# Patient Record
Sex: MALE | Race: WHITE | NOT HISPANIC OR LATINO | Employment: OTHER | URBAN - METROPOLITAN AREA
[De-identification: names, ages, dates, MRNs, and addresses within clinical notes are randomized per-mention and may not be internally consistent; named-entity substitution may affect disease eponyms.]

---

## 2020-05-28 LAB
BUN SERPL-MCNC: 21.2 MG/DL (ref 8.4–25.7)
CALCIUM SERPL-MCNC: 8.6 MG/DL (ref 8.8–10)
CHLORIDE SERPL-SCNC: 107 MMOL/L (ref 98–107)
CO2 SERPL-SCNC: 24 MMOL/L (ref 23–31)
CREAT SERPL-MCNC: 0.8 MG/DL (ref 0.73–1.18)
CREAT/UREA NIT SERPL: 26
ERYTHROCYTE [DISTWIDTH] IN BLOOD BY AUTOMATED COUNT: 12.5 % (ref 11.5–17)
GLUCOSE SERPL-MCNC: 110 MG/DL (ref 82–115)
HCT VFR BLD AUTO: 41.9 % (ref 42–52)
HGB BLD-MCNC: 13.9 GM/DL (ref 14–18)
INR PPP: 1.2 (ref 0–1.3)
MAGNESIUM SERPL-MCNC: 1.95 MG/DL (ref 1.6–2.6)
MCH RBC QN AUTO: 31.7 PG (ref 27–31)
MCHC RBC AUTO-ENTMCNC: 33.2 GM/DL (ref 33–36)
MCV RBC AUTO: 95.4 FL (ref 80–94)
PLATELET # BLD AUTO: 156 X10(3)/MCL (ref 130–400)
PMV BLD AUTO: 10.8 FL (ref 9.4–12.4)
POTASSIUM SERPL-SCNC: 4.3 MMOL/L (ref 3.5–5.1)
PROTHROMBIN TIME: 14.6 SECOND(S) (ref 11.1–13.7)
RBC # BLD AUTO: 4.39 X10(6)/MCL (ref 4.7–6.1)
SODIUM SERPL-SCNC: 138 MMOL/L (ref 136–145)
WBC # SPEC AUTO: 7.6 X10(3)/MCL (ref 4.5–11.5)

## 2020-06-02 ENCOUNTER — HOSPITAL ENCOUNTER (OUTPATIENT)
Dept: ADMINISTRATIVE | Facility: HOSPITAL | Age: 65
End: 2020-06-03
Attending: INTERNAL MEDICINE | Admitting: INTERNAL MEDICINE

## 2020-06-02 LAB
BUN SERPL-MCNC: 19.3 MG/DL (ref 8.4–25.7)
CALCIUM SERPL-MCNC: 8.6 MG/DL (ref 8.8–10)
CHLORIDE SERPL-SCNC: 105 MMOL/L (ref 98–107)
CO2 SERPL-SCNC: 24 MMOL/L (ref 23–31)
CREAT SERPL-MCNC: 1.39 MG/DL (ref 0.73–1.18)
CREAT/UREA NIT SERPL: 14
GLUCOSE SERPL-MCNC: 115 MG/DL (ref 82–115)
POTASSIUM SERPL-SCNC: 4 MMOL/L (ref 3.5–5.1)
SODIUM SERPL-SCNC: 137 MMOL/L (ref 136–145)

## 2020-06-03 LAB
ABS NEUT (OLG): 10.33 X10(3)/MCL (ref 2.1–9.2)
ALBUMIN SERPL-MCNC: 3.7 GM/DL (ref 3.4–4.8)
ALBUMIN/GLOB SERPL: 1.3 RATIO (ref 1.1–2)
ALP SERPL-CCNC: 56 UNIT/L (ref 40–150)
ALT SERPL-CCNC: 37 UNIT/L (ref 0–55)
AST SERPL-CCNC: 70 UNIT/L (ref 5–34)
BASOPHILS # BLD AUTO: 0 X10(3)/MCL (ref 0–0.2)
BASOPHILS NFR BLD AUTO: 0 %
BILIRUB SERPL-MCNC: 0.5 MG/DL
BILIRUBIN DIRECT+TOT PNL SERPL-MCNC: 0.1 MG/DL (ref 0–0.5)
BILIRUBIN DIRECT+TOT PNL SERPL-MCNC: 0.4 MG/DL (ref 0–0.8)
BUN SERPL-MCNC: 15.6 MG/DL (ref 8.4–25.7)
CALCIUM SERPL-MCNC: 8.2 MG/DL (ref 8.8–10)
CHLORIDE SERPL-SCNC: 104 MMOL/L (ref 98–107)
CO2 SERPL-SCNC: 28 MMOL/L (ref 23–31)
CREAT SERPL-MCNC: 0.94 MG/DL (ref 0.73–1.18)
EOSINOPHIL # BLD AUTO: 0 X10(3)/MCL (ref 0–0.9)
EOSINOPHIL NFR BLD AUTO: 0 %
ERYTHROCYTE [DISTWIDTH] IN BLOOD BY AUTOMATED COUNT: 12.7 % (ref 11.5–17)
GLOBULIN SER-MCNC: 2.9 GM/DL (ref 2.4–3.5)
GLUCOSE SERPL-MCNC: 130 MG/DL (ref 82–115)
HCT VFR BLD AUTO: 41 % (ref 42–52)
HGB BLD-MCNC: 13.5 GM/DL (ref 14–18)
LYMPHOCYTES # BLD AUTO: 0.9 X10(3)/MCL (ref 0.6–4.6)
LYMPHOCYTES NFR BLD AUTO: 7 %
MCH RBC QN AUTO: 31.8 PG (ref 27–31)
MCHC RBC AUTO-ENTMCNC: 32.9 GM/DL (ref 33–36)
MCV RBC AUTO: 96.7 FL (ref 80–94)
MONOCYTES # BLD AUTO: 1.1 X10(3)/MCL (ref 0.1–1.3)
MONOCYTES NFR BLD AUTO: 9 %
NEUTROPHILS # BLD AUTO: 10.33 X10(3)/MCL (ref 2.1–9.2)
NEUTROPHILS NFR BLD AUTO: 83 %
PLATELET # BLD AUTO: 158 X10(3)/MCL (ref 130–400)
PMV BLD AUTO: 10.9 FL (ref 9.4–12.4)
POTASSIUM SERPL-SCNC: 4.1 MMOL/L (ref 3.5–5.1)
PROT SERPL-MCNC: 6.6 GM/DL (ref 5.8–7.6)
RBC # BLD AUTO: 4.24 X10(6)/MCL (ref 4.7–6.1)
SODIUM SERPL-SCNC: 138 MMOL/L (ref 136–145)
WBC # SPEC AUTO: 12.4 X10(3)/MCL (ref 4.5–11.5)

## 2020-07-28 ENCOUNTER — HISTORICAL (OUTPATIENT)
Dept: CARDIOLOGY | Facility: HOSPITAL | Age: 65
End: 2020-07-28

## 2023-02-16 ENCOUNTER — HOSPITAL ENCOUNTER (INPATIENT)
Facility: HOSPITAL | Age: 68
LOS: 3 days | Discharge: HOME | DRG: 202 | End: 2023-02-19
Attending: STUDENT IN AN ORGANIZED HEALTH CARE EDUCATION/TRAINING PROGRAM | Admitting: INTERNAL MEDICINE
Payer: MEDICARE

## 2023-02-16 DIAGNOSIS — R09.02 HYPOXIA: Primary | ICD-10-CM

## 2023-02-16 DIAGNOSIS — R07.9 CHEST PAIN: ICD-10-CM

## 2023-02-16 DIAGNOSIS — J18.9 PNEUMONIA DUE TO INFECTIOUS ORGANISM, UNSPECIFIED LATERALITY, UNSPECIFIED PART OF LUNG: ICD-10-CM

## 2023-02-16 DIAGNOSIS — R06.02 SHORTNESS OF BREATH: ICD-10-CM

## 2023-02-16 DIAGNOSIS — R06.02 SOB (SHORTNESS OF BREATH): ICD-10-CM

## 2023-02-16 DIAGNOSIS — J44.9 CHRONIC OBSTRUCTIVE PULMONARY DISEASE, UNSPECIFIED COPD TYPE: ICD-10-CM

## 2023-02-16 LAB
ALBUMIN SERPL-MCNC: 4.2 G/DL (ref 3.4–4.8)
ALBUMIN/GLOB SERPL: 1.3 RATIO (ref 1.1–2)
ALP SERPL-CCNC: 58 UNIT/L (ref 40–150)
ALT SERPL-CCNC: 34 UNIT/L (ref 0–55)
APPEARANCE UR: CLEAR
AST SERPL-CCNC: 20 UNIT/L (ref 5–34)
BACTERIA #/AREA URNS AUTO: NORMAL /HPF
BASOPHILS # BLD AUTO: 0.02 X10(3)/MCL (ref 0–0.2)
BASOPHILS NFR BLD AUTO: 0.2 %
BILIRUB UR QL STRIP.AUTO: NEGATIVE MG/DL
BILIRUBIN DIRECT+TOT PNL SERPL-MCNC: 0.4 MG/DL
BUN SERPL-MCNC: 25.4 MG/DL (ref 8.4–25.7)
CALCIUM SERPL-MCNC: 9.7 MG/DL (ref 8.8–10)
CHLORIDE SERPL-SCNC: 103 MMOL/L (ref 98–107)
CO2 SERPL-SCNC: 24 MMOL/L (ref 23–31)
COLOR UR AUTO: YELLOW
CREAT SERPL-MCNC: 1.18 MG/DL (ref 0.73–1.18)
EOSINOPHIL # BLD AUTO: 0.01 X10(3)/MCL (ref 0–0.9)
EOSINOPHIL NFR BLD AUTO: 0.1 %
ERYTHROCYTE [DISTWIDTH] IN BLOOD BY AUTOMATED COUNT: 13.5 % (ref 11.5–17)
FLUAV AG UPPER RESP QL IA.RAPID: NOT DETECTED
FLUBV AG UPPER RESP QL IA.RAPID: NOT DETECTED
GFR SERPLBLD CREATININE-BSD FMLA CKD-EPI: >60 MLS/MIN/1.73/M2
GLOBULIN SER-MCNC: 3.2 GM/DL (ref 2.4–3.5)
GLUCOSE SERPL-MCNC: 166 MG/DL (ref 82–115)
GLUCOSE UR QL STRIP.AUTO: ABNORMAL MG/DL
HCT VFR BLD AUTO: 46.4 % (ref 42–52)
HGB BLD-MCNC: 15.9 GM/DL (ref 14–18)
IMM GRANULOCYTES # BLD AUTO: 0.16 X10(3)/MCL (ref 0–0.04)
IMM GRANULOCYTES NFR BLD AUTO: 1.6 %
INR BLD: 1.74 (ref 0–1.3)
KETONES UR QL STRIP.AUTO: NEGATIVE MG/DL
LEUKOCYTE ESTERASE UR QL STRIP.AUTO: NEGATIVE UNIT/L
LYMPHOCYTES # BLD AUTO: 0.79 X10(3)/MCL (ref 0.6–4.6)
LYMPHOCYTES NFR BLD AUTO: 7.9 %
MCH RBC QN AUTO: 31.9 PG
MCHC RBC AUTO-ENTMCNC: 34.3 MG/DL (ref 33–36)
MCV RBC AUTO: 93.2 FL (ref 80–94)
MONOCYTES # BLD AUTO: 0.52 X10(3)/MCL (ref 0.1–1.3)
MONOCYTES NFR BLD AUTO: 5.2 %
NEUTROPHILS # BLD AUTO: 8.47 X10(3)/MCL (ref 2.1–9.2)
NEUTROPHILS NFR BLD AUTO: 85 %
NITRITE UR QL STRIP.AUTO: NEGATIVE
NRBC BLD AUTO-RTO: 0 %
PH UR STRIP.AUTO: 5 [PH]
PLATELET # BLD AUTO: 205 X10(3)/MCL (ref 130–400)
PMV BLD AUTO: 9.9 FL (ref 7.4–10.4)
POTASSIUM SERPL-SCNC: 4.2 MMOL/L (ref 3.5–5.1)
PROT SERPL-MCNC: 7.4 GM/DL (ref 5.8–7.6)
PROT UR QL STRIP.AUTO: NEGATIVE MG/DL
PROTHROMBIN TIME: 20.1 SECONDS (ref 12.5–14.5)
RBC # BLD AUTO: 4.98 X10(6)/MCL (ref 4.7–6.1)
RBC #/AREA URNS AUTO: <5 /HPF
RBC UR QL AUTO: NEGATIVE UNIT/L
SARS-COV-2 RNA RESP QL NAA+PROBE: NOT DETECTED
SODIUM SERPL-SCNC: 140 MMOL/L (ref 136–145)
SP GR UR STRIP.AUTO: 1.02 (ref 1–1.03)
SQUAMOUS #/AREA URNS AUTO: <5 /HPF
TROPONIN I SERPL-MCNC: <0.01 NG/ML (ref 0–0.04)
UROBILINOGEN UR STRIP-ACNC: 0.2 MG/DL
WBC # SPEC AUTO: 10 X10(3)/MCL (ref 4.5–11.5)
WBC #/AREA URNS AUTO: <5 /HPF

## 2023-02-16 PROCEDURE — 96374 THER/PROPH/DIAG INJ IV PUSH: CPT

## 2023-02-16 PROCEDURE — 94761 N-INVAS EAR/PLS OXIMETRY MLT: CPT

## 2023-02-16 PROCEDURE — 80053 COMPREHEN METABOLIC PANEL: CPT | Performed by: PHYSICIAN ASSISTANT

## 2023-02-16 PROCEDURE — 11000001 HC ACUTE MED/SURG PRIVATE ROOM

## 2023-02-16 PROCEDURE — 87798 DETECT AGENT NOS DNA AMP: CPT | Performed by: INTERNAL MEDICINE

## 2023-02-16 PROCEDURE — 99285 EMERGENCY DEPT VISIT HI MDM: CPT | Mod: 25

## 2023-02-16 PROCEDURE — 85025 COMPLETE CBC W/AUTO DIFF WBC: CPT | Performed by: PHYSICIAN ASSISTANT

## 2023-02-16 PROCEDURE — 93005 ELECTROCARDIOGRAM TRACING: CPT

## 2023-02-16 PROCEDURE — 25000242 PHARM REV CODE 250 ALT 637 W/ HCPCS: Performed by: NURSE PRACTITIONER

## 2023-02-16 PROCEDURE — 25000242 PHARM REV CODE 250 ALT 637 W/ HCPCS: Performed by: STUDENT IN AN ORGANIZED HEALTH CARE EDUCATION/TRAINING PROGRAM

## 2023-02-16 PROCEDURE — 27000221 HC OXYGEN, UP TO 24 HOURS

## 2023-02-16 PROCEDURE — 27000190 HC CPAP FULL FACE MASK W/VALVE

## 2023-02-16 PROCEDURE — 25500020 PHARM REV CODE 255: Performed by: STUDENT IN AN ORGANIZED HEALTH CARE EDUCATION/TRAINING PROGRAM

## 2023-02-16 PROCEDURE — 87040 BLOOD CULTURE FOR BACTERIA: CPT | Performed by: STUDENT IN AN ORGANIZED HEALTH CARE EDUCATION/TRAINING PROGRAM

## 2023-02-16 PROCEDURE — 63600175 PHARM REV CODE 636 W HCPCS: Performed by: STUDENT IN AN ORGANIZED HEALTH CARE EDUCATION/TRAINING PROGRAM

## 2023-02-16 PROCEDURE — 85610 PROTHROMBIN TIME: CPT | Performed by: PHYSICIAN ASSISTANT

## 2023-02-16 PROCEDURE — 25000003 PHARM REV CODE 250: Performed by: STUDENT IN AN ORGANIZED HEALTH CARE EDUCATION/TRAINING PROGRAM

## 2023-02-16 PROCEDURE — 84484 ASSAY OF TROPONIN QUANT: CPT | Performed by: PHYSICIAN ASSISTANT

## 2023-02-16 PROCEDURE — 99900031 HC PATIENT EDUCATION (STAT)

## 2023-02-16 PROCEDURE — 93010 EKG 12-LEAD: ICD-10-PCS | Mod: ,,, | Performed by: INTERNAL MEDICINE

## 2023-02-16 PROCEDURE — 94660 CPAP INITIATION&MGMT: CPT

## 2023-02-16 PROCEDURE — 87641 MR-STAPH DNA AMP PROBE: CPT | Performed by: INTERNAL MEDICINE

## 2023-02-16 PROCEDURE — 81001 URINALYSIS AUTO W/SCOPE: CPT | Performed by: PHYSICIAN ASSISTANT

## 2023-02-16 PROCEDURE — 94640 AIRWAY INHALATION TREATMENT: CPT

## 2023-02-16 PROCEDURE — 0240U COVID/FLU A&B PCR: CPT | Performed by: PHYSICIAN ASSISTANT

## 2023-02-16 PROCEDURE — 94799 UNLISTED PULMONARY SVC/PX: CPT

## 2023-02-16 PROCEDURE — 99900035 HC TECH TIME PER 15 MIN (STAT)

## 2023-02-16 PROCEDURE — 93010 ELECTROCARDIOGRAM REPORT: CPT | Mod: ,,, | Performed by: INTERNAL MEDICINE

## 2023-02-16 RX ORDER — MELOXICAM 15 MG/1
15 TABLET ORAL NIGHTLY
COMMUNITY

## 2023-02-16 RX ORDER — CLONIDINE HYDROCHLORIDE 0.3 MG/1
0.5 TABLET ORAL 2 TIMES DAILY
Status: ON HOLD | COMMUNITY
End: 2023-02-19 | Stop reason: HOSPADM

## 2023-02-16 RX ORDER — SODIUM CHLORIDE 0.9 % (FLUSH) 0.9 %
10 SYRINGE (ML) INJECTION EVERY 12 HOURS PRN
Status: DISCONTINUED | OUTPATIENT
Start: 2023-02-16 | End: 2023-02-19 | Stop reason: HOSPADM

## 2023-02-16 RX ORDER — METFORMIN HYDROCHLORIDE 500 MG/1
500 TABLET ORAL 2 TIMES DAILY
COMMUNITY
Start: 2023-01-24

## 2023-02-16 RX ORDER — ACETAMINOPHEN 325 MG/1
650 TABLET ORAL EVERY 4 HOURS PRN
Status: DISCONTINUED | OUTPATIENT
Start: 2023-02-16 | End: 2023-02-19 | Stop reason: HOSPADM

## 2023-02-16 RX ORDER — ESCITALOPRAM OXALATE 20 MG/1
20 TABLET ORAL NIGHTLY
COMMUNITY
Start: 2023-01-24

## 2023-02-16 RX ORDER — METOPROLOL SUCCINATE 25 MG/1
12.5 TABLET, EXTENDED RELEASE ORAL 2 TIMES DAILY
COMMUNITY

## 2023-02-16 RX ORDER — ACETAMINOPHEN 325 MG/1
650 TABLET ORAL EVERY 8 HOURS PRN
Status: DISCONTINUED | OUTPATIENT
Start: 2023-02-16 | End: 2023-02-19 | Stop reason: HOSPADM

## 2023-02-16 RX ORDER — ALBUTEROL SULFATE 0.83 MG/ML
2.5 SOLUTION RESPIRATORY (INHALATION)
Status: COMPLETED | OUTPATIENT
Start: 2023-02-16 | End: 2023-02-16

## 2023-02-16 RX ORDER — DAPAGLIFLOZIN 10 MG/1
10 TABLET, FILM COATED ORAL EVERY MORNING
COMMUNITY
Start: 2023-01-18

## 2023-02-16 RX ORDER — MELATONIN 10 MG
TABLET, SUBLINGUAL SUBLINGUAL
COMMUNITY

## 2023-02-16 RX ORDER — FUROSEMIDE 20 MG/1
20 TABLET ORAL EVERY MORNING
COMMUNITY

## 2023-02-16 RX ORDER — NALOXONE HCL 0.4 MG/ML
0.02 VIAL (ML) INJECTION
Status: DISCONTINUED | OUTPATIENT
Start: 2023-02-16 | End: 2023-02-19 | Stop reason: HOSPADM

## 2023-02-16 RX ORDER — INSULIN ASPART 100 [IU]/ML
0-5 INJECTION, SOLUTION INTRAVENOUS; SUBCUTANEOUS
Status: DISCONTINUED | OUTPATIENT
Start: 2023-02-16 | End: 2023-02-19 | Stop reason: HOSPADM

## 2023-02-16 RX ORDER — OMEPRAZOLE 20 MG/1
20 CAPSULE, DELAYED RELEASE ORAL EVERY MORNING
COMMUNITY

## 2023-02-16 RX ORDER — ATORVASTATIN CALCIUM 20 MG/1
TABLET, FILM COATED ORAL
COMMUNITY

## 2023-02-16 RX ORDER — IPRATROPIUM BROMIDE AND ALBUTEROL SULFATE 2.5; .5 MG/3ML; MG/3ML
3 SOLUTION RESPIRATORY (INHALATION) EVERY 4 HOURS PRN
Status: DISCONTINUED | OUTPATIENT
Start: 2023-02-16 | End: 2023-02-19 | Stop reason: HOSPADM

## 2023-02-16 RX ORDER — IBUPROFEN 200 MG
15 TABLET ORAL
Status: DISCONTINUED | OUTPATIENT
Start: 2023-02-16 | End: 2023-02-19 | Stop reason: HOSPADM

## 2023-02-16 RX ORDER — POTASSIUM CHLORIDE 1500 MG/1
20 TABLET, EXTENDED RELEASE ORAL NIGHTLY
COMMUNITY

## 2023-02-16 RX ORDER — RIVAROXABAN 20 MG/1
15 TABLET, FILM COATED ORAL EVERY MORNING
COMMUNITY
Start: 2023-01-11

## 2023-02-16 RX ORDER — ARFORMOTEROL TARTRATE 15 UG/2ML
15 SOLUTION RESPIRATORY (INHALATION) 2 TIMES DAILY
COMMUNITY

## 2023-02-16 RX ORDER — TALC
6 POWDER (GRAM) TOPICAL NIGHTLY PRN
Status: DISCONTINUED | OUTPATIENT
Start: 2023-02-16 | End: 2023-02-19 | Stop reason: HOSPADM

## 2023-02-16 RX ORDER — HYDROCODONE BITARTRATE AND ACETAMINOPHEN 7.5; 325 MG/1; MG/1
1 TABLET ORAL
Status: COMPLETED | OUTPATIENT
Start: 2023-02-16 | End: 2023-02-16

## 2023-02-16 RX ORDER — METHYLPREDNISOLONE SOD SUCC 125 MG
125 VIAL (EA) INJECTION
Status: COMPLETED | OUTPATIENT
Start: 2023-02-16 | End: 2023-02-16

## 2023-02-16 RX ORDER — SIMETHICONE 80 MG
1 TABLET,CHEWABLE ORAL 4 TIMES DAILY PRN
Status: DISCONTINUED | OUTPATIENT
Start: 2023-02-16 | End: 2023-02-19 | Stop reason: HOSPADM

## 2023-02-16 RX ORDER — PANTOPRAZOLE SODIUM 40 MG/1
40 TABLET, DELAYED RELEASE ORAL NIGHTLY
Status: ON HOLD | COMMUNITY
Start: 2023-01-24 | End: 2023-02-19 | Stop reason: HOSPADM

## 2023-02-16 RX ORDER — IBUPROFEN 200 MG
30 TABLET ORAL
Status: DISCONTINUED | OUTPATIENT
Start: 2023-02-16 | End: 2023-02-19 | Stop reason: HOSPADM

## 2023-02-16 RX ORDER — GLUCAGON 1 MG
1 KIT INJECTION
Status: DISCONTINUED | OUTPATIENT
Start: 2023-02-16 | End: 2023-02-19 | Stop reason: HOSPADM

## 2023-02-16 RX ADMIN — AZITHROMYCIN 500 MG: 500 INJECTION, POWDER, LYOPHILIZED, FOR SOLUTION INTRAVENOUS at 06:02

## 2023-02-16 RX ADMIN — METHYLPREDNISOLONE SODIUM SUCCINATE 125 MG: 125 INJECTION, POWDER, FOR SOLUTION INTRAMUSCULAR; INTRAVENOUS at 03:02

## 2023-02-16 RX ADMIN — CEFTRIAXONE SODIUM 2 G: 2 INJECTION, POWDER, FOR SOLUTION INTRAMUSCULAR; INTRAVENOUS at 05:02

## 2023-02-16 RX ADMIN — IPRATROPIUM BROMIDE AND ALBUTEROL SULFATE 3 ML: 2.5; .5 SOLUTION RESPIRATORY (INHALATION) at 09:02

## 2023-02-16 RX ADMIN — IOPAMIDOL 100 ML: 755 INJECTION, SOLUTION INTRAVENOUS at 03:02

## 2023-02-16 RX ADMIN — ALBUTEROL SULFATE 2.5 MG: 2.5 SOLUTION RESPIRATORY (INHALATION) at 02:02

## 2023-02-16 RX ADMIN — HYDROCODONE BITARTRATE AND ACETAMINOPHEN 1 TABLET: 7.5; 325 TABLET ORAL at 03:02

## 2023-02-16 NOTE — H&P
[facility] Medicine History & Physical Examination       Patient Name: Dom Varner  MRN: [id number]  Patient Class: IP- Inpatient   Admission Date: 2/16/2023   Admitting Physician: AMBER Service   Length of Stay: 0  Attending Physician: Dr. César Byrd   Primary Care Provider: Gildardo Villanueva MD  Face-to-Face encounter date: 02/16/2023  Code Status: Full code  Chief Complaint: Chest Pain (Cp to mid chest pain since last night, reports sob w/ hx copd, afib, and bilateral pe. Pt on 3L o2 at home. 91% on room air in triage. Denies fever, reports cough started symptoms. Pt on xarelto.)        Patient information was obtained from patient, patient's family, past medical records and ER records.     HISTORY OF PRESENT ILLNESS:   Dom Varner is a 67 y.o. male who PMH includes MAHSA, COPD, emphysema, chronic respiratory failure with hypoxia on BiPAP at night, DM type 2, History of PE   Osteoarthritis;  present to the ED at [facility] on 2/16/2023 with a primary complaint of chest pain with associated shortness a breath which started last night and today prompting ED visit.  Patient reports he is on supplemental oxygen therapy per nasal cannula primarily at night however he was needing to wear the oxygen during the day today and reported that his saturations were low at home.  It was reported his O2 saturations were 91% in triage on room air.  No reports of fever, cough, congestion, nausea, vomiting, diarrhea, or any sick contacts.  Patient is on Xarelto. Labs done demonstrated glucose 166; other indices unremarkable.  Influenza a swab influenza B swab SARs CoV 2 PCR not detected.  Initial vital signs /76 pulse 73 respirations 22 temperature 98.4° F O2 saturation 91% on room air.  Patient was placed on supplemental oxygen therapy with improvement in his saturations to 95%.  Blood cultures were collected x2 sets.  Patient was started on IV azithromycin and ceftriaxone therapy.   Patient is admitted to hospital medicine services for further management.  PAST MEDICAL HISTORY:   MAHSA  COPD   Emphysema   HTN  DM type 2   History of PE   Osteoarthritis     PAST SURGICAL HISTORY:   Angiogram  Endoscopy    ALLERGIES:   Codeine and Penicillins    FAMILY HISTORY:   Reviewed and negative    SOCIAL HISTORY:   Denies any alcohol use  Denies any illicit drug use  Denies any tobacco use  Lives with family     HOME MEDICATIONS:   As documented:   Albuterol MDI   Albuterol neb treatments   Atorvastatin 20 mg 1 p.o. q.h.s.   Chlorzoxazone 500 mg 1 p.o. q.8 hours PRN for pain   Clonazepam 0.5 mg p.o. PRN   Escitalopram 20 mg 1 p.o. daily   Farxiga 10 mg 1 p.o. daily   Furosemide 20 mg 1 p.o. daily   Meloxicam 15 mg p.o. PRN   Metformin 1000 mg 1 p.o. b.i.d.   Metoprolol 25 mg 1/2 tab p.o. b.i.d.   Omeprazole 20 mg 1 p.o. daily   Potassium chloride 20 mEq p.o. daily   Xarelto 15 mg 1 p.o. daily  Tramadol 50 mg 1 p.o. q.8 hours PRN pain   Trelegy Ellipta 100 mcg-62.5 mcg-25 mcg powder inhalation 1 puff daily     REVIEW OF SYSTEMS:   Except as documented, all other systems reviewed and negative     PHYSICAL EXAM:     VITAL SIGNS: 24 HRS MIN & MAX LAST   Temp  Min: 98.4 °F (36.9 °C)  Max: 98.4 °F (36.9 °C) 98.4 °F (36.9 °C)   BP  Min: 123/76  Max: 147/74 (!) 147/74     Pulse  Min: 65  Max: 73  65   Resp  Min: 18  Max: 22 18   SpO2  Min: 91 %  Max: 95 % (!) 93 %         General appearance: Well-developed, well-nourished male , chronically ill-appearing looks older than stated age; fatigued mild respiratory distress  HENT: Atraumatic head. Moist mucous membranes of oral cavity.  Eyes: PERRL   Neck: Supple.   Lungs: diminished bilaterally, symmetrical expansion, mildly labored respirations fine scattered wheezes saturation stable on supplemental O2 therapy  Heart: Regular rate and rhythm. S1 and S2 present capillary refill brisk, peripheral pulses palpable   Abdomen: Soft,obese, non-distended, non-tender. No rebound  tenderness/guarding. Bowel sounds are normal.   Extremities: PORTER, generalized weakness  Skin: warm and dry   Neuro: AAOx3, no acute focal deficits  Psych/mental status: Appropriate mood and affect. Responds appropriately to questions.     LABS AND IMAGING:     Recent Labs   Lab 02/16/23  1014   WBC 10.0   RBC 4.98   HGB 15.9   HCT 46.4   MCV 93.2   MCH 31.9   MCHC 34.3   RDW 13.5      MPV 9.9       Recent Labs   Lab 02/16/23  1014      K 4.2   CO2 24   BUN 25.4   CREATININE 1.18   CALCIUM 9.7   ALBUMIN 4.2   ALKPHOS 58   ALT 34   AST 20   BILITOT 0.4       Microbiology Results (last 7 days)       Procedure Component Value Units Date/Time    Blood culture #2 **CANNOT BE ORDERED STAT** [[id number]] Collected: 02/16/23 1623    Order Status: Sent Specimen: Blood     Blood culture #1 **CANNOT BE ORDERED STAT** [[id number]] Collected: 02/16/23 1623    Order Status: Sent Specimen: Blood              CT Abdomen Pelvis With Contrast  Narrative: EXAMINATION:  CT ABDOMEN PELVIS WITH CONTRAST    CLINICAL HISTORY:  Lower back pain;    TECHNIQUE:  Low dose axial images, sagittal and coronal reformations were obtained from the lung bases to the pubic symphysis following the IV administration of contrast. Automatic exposure control (AEC) is utilized to reduce patient radiation exposure.    COMPARISON:  None.    FINDINGS:  There is bibasilar atelectasis..    There is evidence of hepatic steatosis.  The liver is slightly enlarged in size..  No liver mass or lesion is seen.  Portal and hepatic veins appear normal.    The gallbladder appears normal.  No obvious gallstones are seen.  No biliary dilatation is seen.  No pericholecystic fluid is seen.    The pancreas appears normal.  No pancreatic mass or lesion is seen.    The spleen shows no acute abnormality.    The adrenal glands appear normal.  No adrenal nodule is seen.    The kidneys appear normal.  There is a cyst in the midpole of the right kidney.  No hydronephrosis is  seen.  No hydroureter is seen.  No nephrolithiasis is seen.  No obvious ureteral stones are seen.    Urinary bladder appears grossly unremarkable.    No colitis is seen.  No diverticulitis is seen.  No obvious colonic mass or lesion is seen.  The appendix appears normal.    No free air is seen.  No free fluid is seen.    There are postsurgical changes seen in the lower lumbar spine.  Bones are otherwise unremarkable.  Impression: Hepatic steatosis    Right renal cyst    Bibasilar lung atelectasis    Otherwise grossly unremarkable    Electronically signed by: Fabby Cuevas  Date:    02/16/2023  Time:    15:26  CTA Chest Non-Coronary (PE Studies)  Narrative: EXAMINATION:  CTA CHEST NON CORONARY (PE STUDIES)    CLINICAL HISTORY:  Pulmonary embolism (PE) suspected, high prob;    TECHNIQUE:  Helically acquired images with axial, sagittal and coronal reformations were obtained from the thoracic inlet to the lung bases followingthe IV administration of contrast.  CTA timed for evaluation of the pulmonary arteries.  MIP images were performed.    Automated tube current modulation, weight-based exposure dosing, and/or iterative reconstruction technique utilized to reach lowest reasonably achievable exposure rate.    DLP: 1459 mGy*cm    COMPARISON:  CT chest 08/09/2019    FINDINGS:  BASE OF NECK: No significant abnormality.    AORTA: Left-sided aortic arch.  No aneurysm and no significant atherosclerosis    PULMONARY VASCULATURE: Pulmonary arteries enhance normally.  No evidence of pulmonary embolus.    HEART: Normal size. No effusion.    GARY/MEDIASTINUM: No enlarged lymph nodes by size criteria.    AIRWAYS: Patent.    LUNGS/PLEURA: There is centrilobular, tree-in-bud nodularity in the dependent upper lobes, right greater than left and within the bilateral lower lobes.  Scarring at the left lung base.    UPPER ABDOMEN: No abnormality of the partially imaged upper abdomen.    THORACIC SOFT TISSUES: Unremarkable.    BONES:  Degenerative changes are present at the thoracic spine.  Impression: 1. Multilobar tree-in-bud nodularity within the lungs, most pronounced in the dependent right upper lobe.  Appearance most compatible with infectious or inflammatory bronchiolitis.  2. No evidence of pulmonary embolus.    Electronically signed by: Rosanne Ocampo  Date:    02/16/2023  Time:    15:24  X-Ray Chest 1 View  Narrative: EXAMINATION:  XR CHEST 1 VIEW    CLINICAL HISTORY:  , Shortness of breath.    COMPARISON:  August 9, 2019    FINDINGS:  No alveolar consolidation, effusion, or pneumothorax is seen.   The thoracic aorta is normal  cardiac silhouette, central pulmonary vessels and mediastinum are normal in size and are grossly unremarkable.   visualized osseous structures are grossly unremarkable.    Some fibrotic streaks identified at the left base.    A loop recorder is identified  Impression: No acute chest disease is identified.    Electronically signed by: Romulo Ewing  Date:    02/16/2023  Time:    10:41        ASSESSMENT & PLAN:   ASSESSMENT:  Acute respiratory failure with hypoxia-resolving   Pneumonia-bacterial,right CAP, POA  Acute COPD exacerbation-present on admission   Hyperglycemia  Long-term anticoagulation therapy-on Xarelto   Chronic osteoarthritis-chronic pain syndrome   History of PE   Weakness   MAHSA- Chronic respiratory failure- with supplemental O2 at BIPAP at HS      PLAN:  Folic cultures and sensitivities   Continue with supplemental oxygen therapy keep saturations 92% or greater   Fall precautions   Continue with IV antibiotic therapy adjust according to cultures and sensitivities   Use home BIPAP at night as at home   Resume home medication as deemed necessary  Repeat lab work in a.m.       VTE Prophylaxis: Home Xarelto for DVT prophylaxis and will be advised to be as mobile as possible and sit in a chair as tolerated    Patient condition:   Stable    __________________________________________________________________________  INPATIENT LIST OF MEDICATIONS     Scheduled Meds:   azithromycin  500 mg Intravenous ED 1 Time    [START ON 2/17/2023] azithromycin  500 mg Intravenous Q24H    [START ON 2/17/2023] cefTRIAXone (ROCEPHIN) IVPB  1 g Intravenous Q24H    cefTRIAXone (ROCEPHIN) IVPB  2 g Intravenous ED 1 Time     Continuous Infusions:  PRN Meds:.acetaminophen, acetaminophen, dextrose 10%, dextrose 10%, dextrose, dextrose, glucagon (human recombinant), insulin aspart U-100, melatonin, naloxone, simethicone, sodium chloride 0.9%      IZak FNP have reviewed and discussed the case with Dr. César Byrd.  Please see the following addendum for further assessment and plan from there attending MD.  KATERIN Kelsey   02/16/2023    ----------------------------        66 yo obese gentleman presenting with productive cough, shortness of breath with associated hypoxia that worsens with movement. CXR was wnl. CT suggested Multilobar tree-in-bud nodularity within the lungs, most pronounced in the dependent right upper lobe, with possibility of  infectious or inflammatory bronchiolitis. CT abd showed hepatic steatosis and atelectasis with incidental right renal cyst. When seen at bedside, he was alert, resting well and saturating on mimimal nasal canula O2. Lung exam shows no wheeze, mild rhonchi noted basally. Will continue empiric cap antibiotics and follow resp pcr, mrsa pcr and resp cultures. Will review other home meds after reconciliation, in addition to bronchodilator treatments. Get TTE. Encourage IS use and wean off o2 as tolerated. Therapy when apt.       Critical care diagnosis:Acute on chronic hypoxemic respiratory failure, CAP  Critical care time: 50 minutes

## 2023-02-16 NOTE — ED PROVIDER NOTES
Encounter Date: 2/16/2023    SCRIBE #1 NOTE: I, Maria Antonia Kent, am scribing for, and in the presence of,  Hi Dupont MD. I have scribed the following portions of the note - the EKG reading. Other sections scribed: HPI, ROS, PE.     History     Chief Complaint   Patient presents with    Chest Pain     Cp to mid chest pain since last night, reports sob w/ hx copd, afib, and bilateral pe. Pt on 3L o2 at home. 91% on room air in triage. Denies fever, reports cough started symptoms. Pt on xarelto.     67 year old male w/ hx of COPD, emphysema, Afib on Xarelto, and bilateral pulmonary emboli presents to ED for shortness of breath. He says for the past week he get short of breath when he walks and his O2 drops of 80s at night. He has a bipap he uses nightly. His daughter at bedside states he usually is on 3L O2 at home at nights only, but for the last week he's had to be on it 24/7. She also states he's been coughing a lot recently. Pt also c/o lower back pain and states he's had back problems and surgeries before, but this is lower and different from usual. He states he falls a lot, walks with a cane. Pt states last night he felt like something heavy was sitting on his chest and says his phone was going off that was linked to his heart loop recorder. Pt also reports fatigue and R-sided headache. Daughter states pt recently stated on 50mg prednisone 4 days ago but has not had any relief. Pt lives with his daughter.     His cardiologist is Dr. Sexton. His pulmonologist is Dr. Satish Diaz.    The history is provided by the patient and a relative. No  was used.   Shortness of Breath  This is a chronic problem. Associated symptoms include headaches and cough. Pertinent negatives include no fever, no sore throat, no chest pain, no abdominal pain and no rash.   Review of patient's allergies indicates:   Allergen Reactions    Codeine     Penicillins      No past medical history on file.  No past surgical  history on file.  No family history on file.     Review of Systems   Constitutional:  Positive for fatigue. Negative for fever.   HENT:  Negative for sore throat.    Eyes:  Negative for visual disturbance.   Respiratory:  Positive for cough, chest tightness and shortness of breath.    Cardiovascular:  Negative for chest pain.   Gastrointestinal:  Negative for abdominal pain.   Genitourinary:  Negative for dysuria.   Musculoskeletal:  Positive for back pain. Negative for joint swelling.   Skin:  Negative for rash.   Neurological:  Positive for headaches. Negative for weakness.   Psychiatric/Behavioral:  Negative for confusion.    All other systems reviewed and are negative.    Physical Exam     Initial Vitals [02/16/23 1000]   BP Pulse Resp Temp SpO2   123/76 73 (!) 22 98.4 °F (36.9 °C) (!) 91 %      MAP       --         Physical Exam    Nursing note and vitals reviewed.  Constitutional: He appears well-developed and well-nourished. He is not diaphoretic. No distress.   HENT:   Head: Normocephalic and atraumatic.   Eyes: Conjunctivae and EOM are normal. Pupils are equal, round, and reactive to light.   Neck:   Normal range of motion.  Cardiovascular:  Normal rate, regular rhythm, normal heart sounds and intact distal pulses.           No murmur heard.  Pulmonary/Chest: No respiratory distress. He has no wheezes. He has no rales.   Occasional wheeze.    Abdominal: Abdomen is soft. He exhibits no distension. There is no abdominal tenderness. There is no rebound.   Musculoskeletal:         General: No tenderness or edema. Normal range of motion.      Cervical back: Normal range of motion.     Neurological: He is alert and oriented to person, place, and time. He has normal strength. No cranial nerve deficit. GCS score is 15. GCS eye subscore is 4. GCS verbal subscore is 5. GCS motor subscore is 6.   Skin: Skin is warm and dry. Capillary refill takes less than 2 seconds. No rash noted. No erythema.   Psychiatric: He has a  normal mood and affect.       ED Course   Procedures  Labs Reviewed   COMPREHENSIVE METABOLIC PANEL - Abnormal; Notable for the following components:       Result Value    Glucose Level 166 (*)     All other components within normal limits   PROTIME-INR - Abnormal; Notable for the following components:    PT 20.1 (*)     INR 1.74 (*)     All other components within normal limits   URINALYSIS, REFLEX TO URINE CULTURE - Abnormal; Notable for the following components:    Glucose, UA 3+ (*)     All other components within normal limits   CBC WITH DIFFERENTIAL - Abnormal; Notable for the following components:    IG# 0.16 (*)     All other components within normal limits   MRSA PCR - Abnormal; Notable for the following components:    MRSA PCR SCRN ([facility]) Detected (*)     All other components within normal limits    Narrative:     The Xpert MRSA Assay utilizes automated real-time polymerase chain reaction (PCR) to detect MRSA DNA.  A positive test result does not necessarily indicate the presence of viable organism.  It is however, presumptive for the presence of MRSA.   COMPREHENSIVE METABOLIC PANEL - Abnormal; Notable for the following components:    Glucose Level 131 (*)     Blood Urea Nitrogen 27.1 (*)     All other components within normal limits   CBC WITH DIFFERENTIAL - Abnormal; Notable for the following components:    IG# 0.25 (*)     All other components within normal limits   COVID/FLU A&B PCR - Normal    Narrative:     The Xpert Xpress SARS-CoV-2/FLU/RSV plus is a rapid, multiplexed real-time PCR test intended for the simultaneous qualitative detection and differentiation of SARS-CoV-2, Influenza A, Influenza B, and respiratory syncytial virus (RSV) viral RNA in either nasopharyngeal swab or nasal swab specimens.         TROPONIN I - Normal   URINALYSIS, MICROSCOPIC - Normal   BLOOD CULTURE [facility]   BLOOD CULTURE [facility]   RESPIRATORY CULTURE ([facility])   CBC W/ AUTO DIFFERENTIAL    Narrative:     The following orders were  created for panel order CBC auto differential.  Procedure                               Abnormality         Status                     ---------                               -----------         ------                     CBC with Differential[[id number]]        Abnormal            Final result                 Please view results for these tests on the individual orders.   CBC W/ AUTO DIFFERENTIAL    Narrative:     The following orders were created for panel order CBC with Automated Differential.  Procedure                               Abnormality         Status                     ---------                               -----------         ------                     CBC with Differential[[id number]]        Abnormal            Final result                 Please view results for these tests on the individual orders.   RESPIRATORY PANEL   POCT GLUCOSE, HAND-HELD DEVICE   POCT GLUCOSE, HAND-HELD DEVICE   POCT GLUCOSE, HAND-HELD DEVICE     EKG Readings: (Independently Interpreted)   Initial Reading: No STEMI. Rhythm: Normal Sinus Rhythm. Heart Rate: 74. Ectopy: No Ectopy. Conduction: Normal. ST Segments: Normal ST Segments. T Waves: Normal. Axis: Normal. Clinical Impression: Normal Sinus Rhythm   Taken at 1002, 02/16/23.   ECG Results              EKG 12-lead (Final result)  Result time 02/16/23 12:56:28      Final result by Interface, Lab In [facility] (02/16/23 12:56:28)                   Narrative:    Test Reason : R06.02,    Vent. Rate : 074 BPM     Atrial Rate : 074 BPM     P-R Int : 174 ms          QRS Dur : 080 ms      QT Int : 386 ms       P-R-T Axes : 028 -16 009 degrees     QTc Int : 428 ms    Normal sinus rhythm  Normal ECG  No previous ECGs available  Confirmed by Vitaly Sr MD ([id number]) on 2/16/2023 12:56:18 PM    Referred By:             Confirmed By:Vitaly Sr MD                                  Imaging Results              X-Ray Chest 1 View (In process)                      CT Abdomen Pelvis With  Contrast (Final result)  Result time 02/16/23 15:26:14      Final result by Fabby Cuevas MD (02/16/23 15:26:14)                   Impression:      Hepatic steatosis    Right renal cyst    Bibasilar lung atelectasis    Otherwise grossly unremarkable      Electronically signed by: Fabby Cuevas  Date:    02/16/2023  Time:    15:26               Narrative:    EXAMINATION:  CT ABDOMEN PELVIS WITH CONTRAST    CLINICAL HISTORY:  Lower back pain;    TECHNIQUE:  Low dose axial images, sagittal and coronal reformations were obtained from the lung bases to the pubic symphysis following the IV administration of contrast. Automatic exposure control (AEC) is utilized to reduce patient radiation exposure.    COMPARISON:  None.    FINDINGS:  There is bibasilar atelectasis..    There is evidence of hepatic steatosis.  The liver is slightly enlarged in size..  No liver mass or lesion is seen.  Portal and hepatic veins appear normal.    The gallbladder appears normal.  No obvious gallstones are seen.  No biliary dilatation is seen.  No pericholecystic fluid is seen.    The pancreas appears normal.  No pancreatic mass or lesion is seen.    The spleen shows no acute abnormality.    The adrenal glands appear normal.  No adrenal nodule is seen.    The kidneys appear normal.  There is a cyst in the midpole of the right kidney.  No hydronephrosis is seen.  No hydroureter is seen.  No nephrolithiasis is seen.  No obvious ureteral stones are seen.    Urinary bladder appears grossly unremarkable.    No colitis is seen.  No diverticulitis is seen.  No obvious colonic mass or lesion is seen.  The appendix appears normal.    No free air is seen.  No free fluid is seen.    There are postsurgical changes seen in the lower lumbar spine.  Bones are otherwise unremarkable.                                       CTA Chest Non-Coronary (PE Studies) (Final result)  Result time 02/16/23 15:24:38      Final result by Rosanne Ocampo MD  (02/16/23 15:24:38)                   Impression:      1. Multilobar tree-in-bud nodularity within the lungs, most pronounced in the dependent right upper lobe.  Appearance most compatible with infectious or inflammatory bronchiolitis.  2. No evidence of pulmonary embolus.      Electronically signed by: Rosanne Ocampo  Date:    02/16/2023  Time:    15:24               Narrative:    EXAMINATION:  CTA CHEST NON CORONARY (PE STUDIES)    CLINICAL HISTORY:  Pulmonary embolism (PE) suspected, high prob;    TECHNIQUE:  Helically acquired images with axial, sagittal and coronal reformations were obtained from the thoracic inlet to the lung bases followingthe IV administration of contrast.  CTA timed for evaluation of the pulmonary arteries.  MIP images were performed.    Automated tube current modulation, weight-based exposure dosing, and/or iterative reconstruction technique utilized to reach lowest reasonably achievable exposure rate.    DLP: 1459 mGy*cm    COMPARISON:  CT chest 08/09/2019    FINDINGS:  BASE OF NECK: No significant abnormality.    AORTA: Left-sided aortic arch.  No aneurysm and no significant atherosclerosis    PULMONARY VASCULATURE: Pulmonary arteries enhance normally.  No evidence of pulmonary embolus.    HEART: Normal size. No effusion.    GARY/MEDIASTINUM: No enlarged lymph nodes by size criteria.    AIRWAYS: Patent.    LUNGS/PLEURA: There is centrilobular, tree-in-bud nodularity in the dependent upper lobes, right greater than left and within the bilateral lower lobes.  Scarring at the left lung base.    UPPER ABDOMEN: No abnormality of the partially imaged upper abdomen.    THORACIC SOFT TISSUES: Unremarkable.    BONES: Degenerative changes are present at the thoracic spine.                                       X-Ray Chest 1 View (Final result)  Result time 02/16/23 10:41:43      Final result by Romulo Ewing MD (02/16/23 10:41:43)                   Impression:      No acute chest disease is  identified.      Electronically signed by: Romulo Ewing  Date:    02/16/2023  Time:    10:41               Narrative:    EXAMINATION:  XR CHEST 1 VIEW    CLINICAL HISTORY:  , Shortness of breath.    COMPARISON:  August 9, 2019    FINDINGS:  No alveolar consolidation, effusion, or pneumothorax is seen.   The thoracic aorta is normal  cardiac silhouette, central pulmonary vessels and mediastinum are normal in size and are grossly unremarkable.   visualized osseous structures are grossly unremarkable.    Some fibrotic streaks identified at the left base.    A loop recorder is identified                                    X-Rays:   Independently Interpreted Readings:   Chest X-Ray: No acute abnormalities.   Medications   sodium chloride 0.9% flush 10 mL (has no administration in time range)   naloxone 0.4 mg/mL injection 0.02 mg (has no administration in time range)   glucagon (human recombinant) injection 1 mg (has no administration in time range)   dextrose 10% bolus 125 mL 125 mL (has no administration in time range)   dextrose 10% bolus 250 mL 250 mL (has no administration in time range)   glucose chewable tablet 16 g (has no administration in time range)   glucose chewable tablet 32 g (has no administration in time range)   acetaminophen tablet 650 mg (has no administration in time range)   acetaminophen tablet 650 mg (has no administration in time range)   melatonin tablet 6 mg (has no administration in time range)   simethicone chewable tablet 80 mg (has no administration in time range)   insulin aspart U-100 injection 0-5 Units (has no administration in time range)   azithromycin 500 mg in dextrose 5 % 250 mL IVPB (ready to mix system) (has no administration in time range)   cefTRIAXone (ROCEPHIN) 1 g in dextrose 5 % in water (D5W) 5 % 50 mL IVPB (MB+) (has no administration in time range)   albuterol-ipratropium 2.5 mg-0.5 mg/3 mL nebulizer solution 3 mL (3 mLs Nebulization Given 2/17/23 0311)   mupirocin  2 % ointment 22 g (has no administration in time range)   atorvastatin tablet 20 mg (has no administration in time range)   EScitalopram oxalate tablet 20 mg (has no administration in time range)   metoprolol succinate (TOPROL-XL) 24 hr split tablet 12.5 mg (has no administration in time range)   pantoprazole EC tablet 40 mg (has no administration in time range)   rivaroxaban tablet 15 mg (has no administration in time range)   dextromethorphan-guaiFENesin  mg per 12 hr tablet 1 tablet (has no administration in time range)   dextromethorphan-guaiFENesin  mg/5 ml liquid 5 mL (has no administration in time range)   fluticasone furoate-vilanteroL 200-25 mcg/dose diskus inhaler 1 puff (has no administration in time range)   albuterol nebulizer solution 2.5 mg (2.5 mg Nebulization Given 2/16/23 1430)   methylPREDNISolone sodium succinate injection 125 mg (125 mg Intravenous Given 2/16/23 1505)   iopamidoL (ISOVUE-370) injection 100 mL (100 mLs Intravenous Given 2/16/23 1509)   azithromycin 500 mg in dextrose 5 % 250 mL IVPB (ready to mix system) (0 mg Intravenous Stopped 2/16/23 1911)   cefTRIAXone (ROCEPHIN) 2 g in dextrose 5 % in water (D5W) 5 % 50 mL IVPB (MB+) (0 g Intravenous Stopped 2/16/23 1740)   HYDROcodone-acetaminophen 7.5-325 mg per tablet 1 tablet (1 tablet Oral Given 2/16/23 1545)     Medical Decision Making  Problems Addressed:  Hypoxia: acute illness or injury that poses a threat to life or bodily functions  Pneumonia due to infectious organism, unspecified laterality, unspecified part of lung: acute illness or injury that poses a threat to life or bodily functions  SOB (shortness of breath): acute illness or injury that poses a threat to life or bodily functions    Amount and/or Complexity of Data Reviewed  Independent Historian: caregiver     Details: His daughter at bedside states he usually is on 3L O2 at home at nights only, but for the last week he's had to be on it 24/7. See HPI.  Labs:  ordered. Decision-making details documented in ED Course.  Radiology: ordered and independent interpretation performed. Decision-making details documented in ED Course.  ECG/medicine tests: ordered and independent interpretation performed. Decision-making details documented in ED Course.       Medical Decision Making:   History:   I obtained history from: someone other than patient.       <> Summary of History:  His daughter at bedside states he usually is on 3L O2 at home at nights only, but for the last week he's had to be on it 24/7. See HPI.  Old Medical Records: I decided to obtain old medical records.  Old Records Summarized: records from clinic visits and records from previous admission(s).       <> Summary of Records: Reviewed old records: Patient with saddle PE in 08/2022.   Initial Assessment:   SOB, requiring 3L oxygen.   Differential Diagnosis:   Judging by the patient's chief complaint and pertinent history, the patient has the following possible differential diagnoses, including but not limited to the following.  Some of these are deemed to be lower likelihood and some more likely based on my physical exam and history combined with possible lab work and/or imaging studies.   Please see the pertinent studies, and refer to the HPI.  Some of these diagnoses will take further evaluation to fully rule out, perhaps as an outpatient and the patient was encouraged to follow up when discharged for more comprehensive evaluation.    ACS, pneumonia, COVID/Flu, congestive heart failure, asthma, COPD, pleural effusion, pulmonary edema, acute bronchitis, PE, pneumothorax, hemothorax, aortic dissection, electrolyte abnormalities, anemia, anxiety     Independently Interpreted Test(s):   I have ordered and independently interpreted X-rays - see prior notes.  I have ordered and independently interpreted EKG Reading(s) - see prior notes  Clinical Tests:   Lab Tests: Ordered and Reviewed  Radiological Study: Ordered and  Reviewed  Medical Tests: Ordered and Reviewed  ED Management:  Patient is a 67-year-old male presents to the emergency department for shortness of breath, now requiring oxygen during the daytime.  States he typically only uses oxygen at night.  Associated symptoms include generalized weakness, fatigue.  Also complaining of lower back pain.  Imaging with evidence of infiltrates, started on antibiotics.  Cultures obtained.  Discussed with medicine for admission.  Answered all questions time.  Patient and family verbalized understanding agreed to plan.      Co-morbidities and/or factors adding to the complexity or risk for the patient?: Yes, hx of COPD  Differential diagnoses: as noted above  Decision to obtain previous or outside records?: Yes  Chart Review (nursing home, outside records, CareEverywhere): Yes  Review of RX medications/new RX prescribed by me?: Yes  My EKG Interpretation: see above  Labs/imaging/other tests obtained/considered (risk/benefits of testing discussed): Yes  Labs/tests intepretation: Cr Nml  My independent imaging interpretation: Yes  Treatment/interventions, IV fluids, IV medications: Yes  Complex management (IV controlled substances, went to OR, DNR, meds requiring monitoring, transfer, etc)?: Yes, given IV abx  Workup/treatment affected by social determinants of health?:No   Point of care US done/interpretation: No  Consults/radiologist/EMS/social work/family discussion/alternate history: Consult to medicine  Advanced care planning/end of life discussion: No  Shared decision making: Yes  ETOH/smoking/drug cessation discussion: No  Dispo: Admit             Scribe Attestation:   Scribe #1: I performed the above scribed service and the documentation accurately describes the services I performed. I attest to the accuracy of the note.    Attending Attestation:           Physician Attestation for Scribe:  Physician Attestation Statement for Scribe #1: I, Hi Dupont MD, reviewed  documentation, as scribed by Maria Antonia Kent in my presence, and it is both accurate and complete.           ED Course as of 02/17/23 0814   Thu Feb 16, 2023   1536 Paged [facility] medicine. [AA]   1639 Consulted hospitalist: patient is admitted. [AA]      ED Course User Index  [AA] Yoli Em                 Clinical Impression:   Final diagnoses:  [R06.02] Shortness of breath  [R09.02] Hypoxia (Primary)  [J18.9] Pneumonia due to infectious organism, unspecified laterality, unspecified part of lung  [J44.9] Chronic obstructive pulmonary disease, unspecified COPD type        ED Disposition Condition    Admit Stable                Hi Dupont MD  02/17/23 0817

## 2023-02-17 LAB
ALBUMIN SERPL-MCNC: 4.1 G/DL (ref 3.4–4.8)
ALBUMIN/GLOB SERPL: 1.4 RATIO (ref 1.1–2)
ALP SERPL-CCNC: 54 UNIT/L (ref 40–150)
ALT SERPL-CCNC: 27 UNIT/L (ref 0–55)
AORTIC ROOT ANNULUS: 3.5 CM
AORTIC VALVE CUSP SEPERATION: 1.7 CM
AST SERPL-CCNC: 15 UNIT/L (ref 5–34)
AV INDEX (PROSTH): 0.71
AV MEAN GRADIENT: 3 MMHG
AV PEAK GRADIENT: 6 MMHG
AV VALVE AREA: 2.24 CM2
AV VELOCITY RATIO: 0.7
B PERT.PT PRMT NPH QL NAA+NON-PROBE: NOT DETECTED
BASOPHILS # BLD AUTO: 0.02 X10(3)/MCL (ref 0–0.2)
BASOPHILS NFR BLD AUTO: 0.2 %
BILIRUBIN DIRECT+TOT PNL SERPL-MCNC: 0.4 MG/DL
BUN SERPL-MCNC: 27.1 MG/DL (ref 8.4–25.7)
C PNEUM DNA NPH QL NAA+NON-PROBE: NOT DETECTED
CALCIUM SERPL-MCNC: 9.7 MG/DL (ref 8.8–10)
CHLORIDE SERPL-SCNC: 103 MMOL/L (ref 98–107)
CO2 SERPL-SCNC: 27 MMOL/L (ref 23–31)
CREAT SERPL-MCNC: 1.09 MG/DL (ref 0.73–1.18)
CV ECHO LV RWT: 0.59 CM
DOP CALC AO PEAK VEL: 1.18 M/S
DOP CALC AO VTI: 21.2 CM
DOP CALC LVOT AREA: 3.1 CM2
DOP CALC LVOT DIAMETER: 2 CM
DOP CALC LVOT PEAK VEL: 0.83 M/S
DOP CALC LVOT STROKE VOLUME: 47.41 CM3
DOP CALC MV VTI: 29.3 CM
DOP CALCLVOT PEAK VEL VTI: 15.1 CM
E WAVE DECELERATION TIME: 187 MSEC
E/A RATIO: 0.97
E/E' RATIO: 10.77 M/S
ECHO LV POSTERIOR WALL: 1.3 CM (ref 0.6–1.1)
EJECTION FRACTION: 60 %
EOSINOPHIL # BLD AUTO: 0.01 X10(3)/MCL (ref 0–0.9)
EOSINOPHIL NFR BLD AUTO: 0.1 %
ERYTHROCYTE [DISTWIDTH] IN BLOOD BY AUTOMATED COUNT: 13.2 % (ref 11.5–17)
FRACTIONAL SHORTENING: 17 % (ref 28–44)
GFR SERPLBLD CREATININE-BSD FMLA CKD-EPI: >60 MLS/MIN/1.73/M2
GLOBULIN SER-MCNC: 3 GM/DL (ref 2.4–3.5)
GLUCOSE SERPL-MCNC: 131 MG/DL (ref 82–115)
HADV DNA NPH QL NAA+NON-PROBE: NOT DETECTED
HCOV 229E RNA NPH QL NAA+NON-PROBE: NOT DETECTED
HCOV HKU1 RNA NPH QL NAA+NON-PROBE: NOT DETECTED
HCOV NL63 RNA NPH QL NAA+NON-PROBE: NOT DETECTED
HCOV OC43 RNA NPH QL NAA+NON-PROBE: NOT DETECTED
HCT VFR BLD AUTO: 45.2 % (ref 42–52)
HGB BLD-MCNC: 15.7 GM/DL (ref 14–18)
HMPV RNA NPH QL NAA+NON-PROBE: NOT DETECTED
HPIV1 RNA NPH QL NAA+NON-PROBE: NOT DETECTED
HPIV2 RNA NPH QL NAA+NON-PROBE: NOT DETECTED
HPIV3 RNA NPH QL NAA+NON-PROBE: NOT DETECTED
HPIV4 RNA NPH QL NAA+NON-PROBE: NOT DETECTED
IMM GRANULOCYTES # BLD AUTO: 0.25 X10(3)/MCL (ref 0–0.04)
IMM GRANULOCYTES NFR BLD AUTO: 2.5 %
INTERVENTRICULAR SEPTUM: 1.26 CM (ref 0.6–1.1)
LEFT INTERNAL DIMENSION IN SYSTOLE: 3.68 CM (ref 2.1–4)
LEFT VENTRICLE DIASTOLIC VOLUME: 143 ML
LEFT VENTRICLE SYSTOLIC VOLUME: 57.4 ML
LEFT VENTRICULAR INTERNAL DIMENSION IN DIASTOLE: 4.43 CM (ref 3.5–6)
LEFT VENTRICULAR MASS: 212.42 G
LV LATERAL E/E' RATIO: 10 M/S
LV SEPTAL E/E' RATIO: 11.67 M/S
LVOT MG: 1 MMHG
LVOT MV: 0.54 CM/S
LYMPHOCYTES # BLD AUTO: 1.02 X10(3)/MCL (ref 0.6–4.6)
LYMPHOCYTES NFR BLD AUTO: 10.3 %
M PNEUMO DNA NPH QL NAA+NON-PROBE: NOT DETECTED
MCH RBC QN AUTO: 32.3 PG
MCHC RBC AUTO-ENTMCNC: 34.7 MG/DL (ref 33–36)
MCV RBC AUTO: 93 FL (ref 80–94)
MONOCYTES # BLD AUTO: 0.56 X10(3)/MCL (ref 0.1–1.3)
MONOCYTES NFR BLD AUTO: 5.7 %
MRSA PCR SCRN (OHS): DETECTED
MV MEAN GRADIENT: 2 MMHG
MV PEAK A VEL: 0.72 M/S
MV PEAK E VEL: 0.7 M/S
MV PEAK GRADIENT: 4 MMHG
MV STENOSIS PRESSURE HALF TIME: 93 MS
MV VALVE AREA BY CONTINUITY EQUATION: 1.62 CM2
MV VALVE AREA P 1/2 METHOD: 2.37 CM2
NEUTROPHILS # BLD AUTO: 8.05 X10(3)/MCL (ref 2.1–9.2)
NEUTROPHILS NFR BLD AUTO: 81.2 %
NRBC BLD AUTO-RTO: 0 %
PISA MRMAX VEL: 1.44 M/S
PISA TR MAX VEL: 1.46 M/S
PLATELET # BLD AUTO: 215 X10(3)/MCL (ref 130–400)
PMV BLD AUTO: 10.2 FL (ref 7.4–10.4)
POCT GLUCOSE: 397 MG/DL (ref 70–110)
POTASSIUM SERPL-SCNC: 4.3 MMOL/L (ref 3.5–5.1)
PROT SERPL-MCNC: 7.1 GM/DL (ref 5.8–7.6)
PV PEAK VELOCITY: 0.98 CM/S
RBC # BLD AUTO: 4.86 X10(6)/MCL (ref 4.7–6.1)
RSV RNA NPH QL NAA+NON-PROBE: NOT DETECTED
RV+EV RNA NPH QL NAA+NON-PROBE: NOT DETECTED
SODIUM SERPL-SCNC: 140 MMOL/L (ref 136–145)
TDI LATERAL: 0.07 M/S
TDI SEPTAL: 0.06 M/S
TDI: 0.07 M/S
TR MAX PG: 9 MMHG
WBC # SPEC AUTO: 9.9 X10(3)/MCL (ref 4.5–11.5)

## 2023-02-17 PROCEDURE — 21400001 HC TELEMETRY ROOM

## 2023-02-17 PROCEDURE — 94799 UNLISTED PULMONARY SVC/PX: CPT

## 2023-02-17 PROCEDURE — 63600175 PHARM REV CODE 636 W HCPCS: Performed by: INTERNAL MEDICINE

## 2023-02-17 PROCEDURE — 25000242 PHARM REV CODE 250 ALT 637 W/ HCPCS: Performed by: INTERNAL MEDICINE

## 2023-02-17 PROCEDURE — 80053 COMPREHEN METABOLIC PANEL: CPT | Performed by: NURSE PRACTITIONER

## 2023-02-17 PROCEDURE — 25000003 PHARM REV CODE 250: Performed by: INTERNAL MEDICINE

## 2023-02-17 PROCEDURE — 63600175 PHARM REV CODE 636 W HCPCS: Performed by: NURSE PRACTITIONER

## 2023-02-17 PROCEDURE — 99900035 HC TECH TIME PER 15 MIN (STAT)

## 2023-02-17 PROCEDURE — 25000003 PHARM REV CODE 250: Performed by: NURSE PRACTITIONER

## 2023-02-17 PROCEDURE — 27000221 HC OXYGEN, UP TO 24 HOURS

## 2023-02-17 PROCEDURE — 94761 N-INVAS EAR/PLS OXIMETRY MLT: CPT

## 2023-02-17 PROCEDURE — 94660 CPAP INITIATION&MGMT: CPT

## 2023-02-17 PROCEDURE — 85025 COMPLETE CBC W/AUTO DIFF WBC: CPT | Performed by: NURSE PRACTITIONER

## 2023-02-17 PROCEDURE — 94640 AIRWAY INHALATION TREATMENT: CPT

## 2023-02-17 PROCEDURE — 87070 CULTURE OTHR SPECIMN AEROBIC: CPT | Performed by: INTERNAL MEDICINE

## 2023-02-17 PROCEDURE — 25000242 PHARM REV CODE 250 ALT 637 W/ HCPCS: Performed by: NURSE PRACTITIONER

## 2023-02-17 RX ORDER — TRAMADOL HYDROCHLORIDE 50 MG/1
50 TABLET ORAL EVERY 6 HOURS PRN
Status: DISCONTINUED | OUTPATIENT
Start: 2023-02-17 | End: 2023-02-19 | Stop reason: HOSPADM

## 2023-02-17 RX ORDER — HYDROXYZINE HYDROCHLORIDE 25 MG/1
25 TABLET, FILM COATED ORAL 3 TIMES DAILY PRN
Status: DISCONTINUED | OUTPATIENT
Start: 2023-02-17 | End: 2023-02-19 | Stop reason: HOSPADM

## 2023-02-17 RX ORDER — ATORVASTATIN CALCIUM 10 MG/1
20 TABLET, FILM COATED ORAL NIGHTLY
Status: DISCONTINUED | OUTPATIENT
Start: 2023-02-17 | End: 2023-02-19 | Stop reason: HOSPADM

## 2023-02-17 RX ORDER — FLUTICASONE FUROATE AND VILANTEROL 200; 25 UG/1; UG/1
1 POWDER RESPIRATORY (INHALATION) DAILY
Status: DISCONTINUED | OUTPATIENT
Start: 2023-02-17 | End: 2023-02-19 | Stop reason: HOSPADM

## 2023-02-17 RX ORDER — IPRATROPIUM BROMIDE AND ALBUTEROL SULFATE 2.5; .5 MG/3ML; MG/3ML
3 SOLUTION RESPIRATORY (INHALATION) EVERY 8 HOURS
Status: DISCONTINUED | OUTPATIENT
Start: 2023-02-17 | End: 2023-02-18

## 2023-02-17 RX ORDER — ESCITALOPRAM OXALATE 10 MG/1
20 TABLET ORAL NIGHTLY
Status: DISCONTINUED | OUTPATIENT
Start: 2023-02-17 | End: 2023-02-19 | Stop reason: HOSPADM

## 2023-02-17 RX ORDER — PROMETHAZINE HYDROCHLORIDE 25 MG/ML
25 INJECTION, SOLUTION INTRAMUSCULAR; INTRAVENOUS EVERY 4 HOURS PRN
Status: DISCONTINUED | OUTPATIENT
Start: 2023-02-17 | End: 2023-02-19 | Stop reason: HOSPADM

## 2023-02-17 RX ORDER — GUAIFENESIN/DEXTROMETHORPHAN 100-10MG/5
5 SYRUP ORAL EVERY 4 HOURS PRN
Status: DISCONTINUED | OUTPATIENT
Start: 2023-02-17 | End: 2023-02-19 | Stop reason: HOSPADM

## 2023-02-17 RX ORDER — PANTOPRAZOLE SODIUM 40 MG/1
40 TABLET, DELAYED RELEASE ORAL DAILY
Status: DISCONTINUED | OUTPATIENT
Start: 2023-02-17 | End: 2023-02-19 | Stop reason: HOSPADM

## 2023-02-17 RX ORDER — MUPIROCIN 20 MG/G
1 OINTMENT TOPICAL 2 TIMES DAILY
Status: DISCONTINUED | OUTPATIENT
Start: 2023-02-17 | End: 2023-02-19 | Stop reason: HOSPADM

## 2023-02-17 RX ORDER — ONDANSETRON 2 MG/ML
4 INJECTION INTRAMUSCULAR; INTRAVENOUS EVERY 6 HOURS PRN
Status: DISCONTINUED | OUTPATIENT
Start: 2023-02-17 | End: 2023-02-19 | Stop reason: HOSPADM

## 2023-02-17 RX ADMIN — IPRATROPIUM BROMIDE AND ALBUTEROL SULFATE 3 ML: 2.5; .5 SOLUTION RESPIRATORY (INHALATION) at 08:02

## 2023-02-17 RX ADMIN — IPRATROPIUM BROMIDE AND ALBUTEROL SULFATE 3 ML: 2.5; .5 SOLUTION RESPIRATORY (INHALATION) at 04:02

## 2023-02-17 RX ADMIN — INSULIN ASPART 5 UNITS: 100 INJECTION, SOLUTION INTRAVENOUS; SUBCUTANEOUS at 10:02

## 2023-02-17 RX ADMIN — AZITHROMYCIN MONOHYDRATE 500 MG: 500 INJECTION, POWDER, LYOPHILIZED, FOR SOLUTION INTRAVENOUS at 05:02

## 2023-02-17 RX ADMIN — ACETAMINOPHEN 650 MG: 325 TABLET ORAL at 08:02

## 2023-02-17 RX ADMIN — MUPIROCIN 22 G: 20 OINTMENT TOPICAL at 08:02

## 2023-02-17 RX ADMIN — VANCOMYCIN HYDROCHLORIDE 1500 MG: 1.5 INJECTION, POWDER, LYOPHILIZED, FOR SOLUTION INTRAVENOUS at 09:02

## 2023-02-17 RX ADMIN — FLUTICASONE FUROATE AND VILANTEROL TRIFENATATE 1 PUFF: 200; 25 POWDER RESPIRATORY (INHALATION) at 08:02

## 2023-02-17 RX ADMIN — IPRATROPIUM BROMIDE AND ALBUTEROL SULFATE 3 ML: 2.5; .5 SOLUTION RESPIRATORY (INHALATION) at 03:02

## 2023-02-17 RX ADMIN — ESCITALOPRAM OXALATE 20 MG: 10 TABLET ORAL at 08:02

## 2023-02-17 RX ADMIN — METOPROLOL SUCCINATE 12.5 MG: 25 TABLET, EXTENDED RELEASE ORAL at 08:02

## 2023-02-17 RX ADMIN — TRAMADOL HYDROCHLORIDE 50 MG: 50 TABLET, COATED ORAL at 10:02

## 2023-02-17 RX ADMIN — GUAIFENESIN AND DEXTROMETHORPHAN HYDROBROMIDE 1 TABLET: 30; 600 TABLET, EXTENDED RELEASE ORAL at 08:02

## 2023-02-17 RX ADMIN — SIMETHICONE 80 MG: 80 TABLET, CHEWABLE ORAL at 08:02

## 2023-02-17 RX ADMIN — ATORVASTATIN CALCIUM 20 MG: 10 TABLET, FILM COATED ORAL at 08:02

## 2023-02-17 RX ADMIN — PANTOPRAZOLE SODIUM 40 MG: 40 TABLET, DELAYED RELEASE ORAL at 08:02

## 2023-02-17 RX ADMIN — VANCOMYCIN HYDROCHLORIDE 1500 MG: 1.5 INJECTION, POWDER, LYOPHILIZED, FOR SOLUTION INTRAVENOUS at 08:02

## 2023-02-17 RX ADMIN — CEFTRIAXONE 1 G: 1 INJECTION, POWDER, FOR SOLUTION INTRAMUSCULAR; INTRAVENOUS at 03:02

## 2023-02-17 RX ADMIN — RIVAROXABAN 15 MG: 15 TABLET, FILM COATED ORAL at 08:02

## 2023-02-17 NOTE — PLAN OF CARE
Daughter Catrina WOO [phone]. Pt dc to her home [address] [place]. All questions on dc assessment based on this address. Prior to hospitalization, pt was having trouble with O2 but daughter picking new machine up from [employer]. Denied known needs prior to dc.   02/17/23 1014   Discharge Assessment   Assessment Type Discharge Planning Assessment   Confirmed/corrected address, phone number and insurance Yes  (going to stay at daughters post discharge [address] [place]. All questions below will be based off discharge address)   Confirmed Demographics Correct on Facesheet   Source of Information patient   When was your last doctors appointment?   (PCP Dr. Gildardo Villanueva)   Communicated DULCE MARIA with patient/caregiver Date not available/Unable to determine   People in Home child(mahi), adult;grandchild(mahi)   Do you expect to return to your current living situation? No  (dc to daughters home)   Do you have help at home or someone to help you manage your care at home? Yes   Who are your caregiver(s) and their phone number(s)? daughter/ POA Catrina Waterman [phone]   Prior to hospitilization cognitive status: Unable to Assess   Current cognitive status: Alert/Oriented   Walking or Climbing Stairs ambulation difficulty, requires equipment   Mobility Management cane   Dressing/Bathing   (no difficulties)   Home Accessibility wheelchair accessible   Home Layout Able to live on 1st floor   Equipment Currently Used at Home oxygen;CPAP;cane, straight;glucometer;other (see comments)  (O2 from [employer])   Readmission within 30 days? No   Patient currently being followed by outpatient case management? No   Do you currently have service(s) that help you manage your care at home? No   Do you take prescription medications? Yes  (Meds to be called into [facility])   Do you have prescription coverage? Yes   Do you have any problems affording any of your prescribed medications? No   Is the patient taking medications as  prescribed? yes   Who is going to help you get home at discharge? daughter   How do you get to doctors appointments? car, drives self   Are you on dialysis? No   Do you take coumadin? No   Discharge Plan A Home with family   Discharge Plan B Home Health   DME Needed Upon Discharge  none   Discharge Plan discussed with: Patient   Discharge Barriers Identified None   OTHER   Name(s) of People in Home daughter Catrina Columba/ AMNA

## 2023-02-17 NOTE — PROGRESS NOTES
[facility] Hospital Medicine Progress Note        Chief Complaint: Inpatient Follow-up for     HPI:   Dom Varner is a 67 y.o. male who PMH includes MAHSA, COPD, emphysema, chronic respiratory failure with hypoxia on BiPAP at night, DM type 2, History of PE   Osteoarthritis;  present to the ED at [facility] on 2/16/2023 with a primary complaint of chest pain with associated shortness a breath which started last night and today prompting ED visit.  Patient reports he is on supplemental oxygen therapy per nasal cannula primarily at night however he was needing to wear the oxygen during the day today and reported that his saturations were low at home.  It was reported his O2 saturations were 91% in triage on room air.  No reports of fever, cough, congestion, nausea, vomiting, diarrhea, or any sick contacts.  Patient is on Xarelto. Labs done demonstrated glucose 166; other indices unremarkable.  Influenza a swab influenza B swab SARs CoV 2 PCR not detected.  Initial vital signs /76 pulse 73 respirations 22 temperature 98.4° F O2 saturation 91% on room air.  Patient was placed on supplemental oxygen therapy with improvement in his saturations to 95%.  Blood cultures were collected x2 sets.  Patient was started on IV azithromycin and ceftriaxone therapy.  Patient is admitted to hospital medicine services for further management.  Interval Hx:   Hemodynamically stable overnight.  Doing well on 2 L nasal cannula oxygen.  When seen and examined bedside he appeared much more comfortable.  Daughter was at bedside.  Denies any worsening of cough.  MRSA PCR positive.  Rest of the labs unremarkable.  Cultures pending in the lab.  TTE and morning x-ray read pending  Objective/physical exam:  Vitals:    02/17/23 0828 02/17/23 0900 02/17/23 0909 02/17/23 0930   BP:  135/70     Pulse: 69 79  76   Resp: 20 14  17   Temp:   98.1 °F (36.7 °C)    TempSrc:   Oral    SpO2: 96% (!) 90%  (!) 90%   Weight:          General: In no acute distress, afebrile  Respiratory:  Expiratory wheeze, minimal rhonchi  Cardiovascular: S1, S2, no appreciable murmur  Abdomen: Soft, nontender, BS +  MSK: Warm, no lower extremity edema, no clubbing or cyanosis  Neurologic: Alert and oriented x4, moving all extremities with good strength     Lab Results   Component Value Date     02/17/2023    K 4.3 02/17/2023    CO2 27 02/17/2023    BUN 27.1 (H) 02/17/2023    CREATININE 1.09 02/17/2023    CALCIUM 9.7 02/17/2023    EGFRNONAA >60 06/03/2020      Lab Results   Component Value Date    ALT 27 02/17/2023    AST 15 02/17/2023    ALKPHOS 54 02/17/2023    BILITOT 0.4 02/17/2023      Lab Results   Component Value Date    WBC 9.9 02/17/2023    HGB 15.7 02/17/2023    HCT 45.2 02/17/2023    MCV 93.0 02/17/2023     02/17/2023           Medications:   atorvastatin  20 mg Oral QHS    azithromycin  500 mg Intravenous Q24H    cefTRIAXone (ROCEPHIN) IVPB  1 g Intravenous Q24H    dextromethorphan-guaiFENesin  mg  1 tablet Oral BID    EScitalopram oxalate  20 mg Oral Nightly    fluticasone furoate-vilanteroL  1 puff Inhalation Daily    metoprolol succinate  12.5 mg Oral BID    mupirocin  1 Tube Nasal BID    pantoprazole  40 mg Oral Daily    rivaroxaban  15 mg Oral QAM    vancomycin (VANCOCIN) IVPB  1,500 mg Intravenous Once      acetaminophen, acetaminophen, albuterol-ipratropium, dextromethorphan-guaiFENesin  mg/5 ml, dextrose 10%, dextrose 10%, glucagon (human recombinant), glucose, glucose, hydrOXYzine HCL, insulin aspart U-100, melatonin, naloxone, simethicone, sodium chloride 0.9%     Assessment/Plan:    Acute hypoxemic respiratory failure  Right-sided Community-acquired pneumonia  Mild exacerbation of COPD due to above  T2 dm, hyperglycemia    HX:  HLD, depression/anxiety, Chronic back pain, history of PE on Xarelto, obesity, MAHSA on trilogy    Plan:  -follow respiratory cultures.  MRSA PCR positive.  Will add vancomycin.  Continue  empiric antibiotic therapy.  Awaiting chest x-ray and TTE  -encourage IS use, weaned off oxygen as tolerated.  Will add antitussives.  Mobility as tolerated  -check HbA1c.  Continue current insulin regimen   -other home medications were reviewed and renewed  -will add tramadol for pain control.  Get PT evaluation.  Holding Lasix and clonidine.  Will add hydroxyzine to help with anxiety.  He uses clonidine p.r.n. at home for anxiety    Protonix  Destiny Byrd MD

## 2023-02-17 NOTE — NURSING
Nurses Note -- 4 Eyes      2/17/2023   2:19 PM      Skin assessed during: Admit      [x] No Pressure Injuries Present    []Prevention Measures Documented      [] Yes- Altered Skin Integrity Present or Discovered   [] LDA Added if Not in Epic (Describe Wound)   [] New Altered Skin Integrity was Present on Admit and Documented in LDA   [] Wound Image Taken    Wound Care Consulted? No    Attending Nurse:  Kalpesh Choudhury RN     Second RN/Staff Member:  Jana Bruce

## 2023-02-17 NOTE — PROGRESS NOTES
Pharmacokinetic Initial Assessment: IV Vancomycin    Assessment/Plan:    Initiate intravenous vancomycin with loading dose of 1500 mg once followed by a maintenance dose of vancomycin 1500mg IV every 12 hours  Desired empiric serum trough concentration is 15 to 20 mcg/mL  Draw vancomycin trough level 60 min prior to fourth dose on 02/18 at approximately 2000  Pharmacy will continue to follow and monitor vancomycin.      Please contact pharmacy at extension [phone] with any questions regarding this assessment.     Thank you for the consult,   Cheo Desai       Patient brief summary:  Dom Varner is a 67 y.o. male initiated on antimicrobial therapy with IV Vancomycin for treatment of suspected  PNA    Drug Allergies:   Review of patient's allergies indicates:   Allergen Reactions    Codeine     Penicillins        Actual Body Weight:   113 kg    Renal Function:   Estimated Creatinine Clearance: 83.1 mL/min (based on SCr of 1.09 mg/dL).,     Dialysis Method (if applicable):  N/A    CBC (last 72 hours):  Recent Labs   Lab Result Units 02/16/23  1014 02/17/23  0422   WBC x10(3)/mcL 10.0 9.9   Hgb gm/dL 15.9 15.7   Hct % 46.4 45.2   Platelet x10(3)/mcL 205 215   Mono % % 5.2 5.7   Eos % % 0.1 0.1   Basophil % % 0.2 0.2       Metabolic Panel (last 72 hours):  Recent Labs   Lab Result Units 02/16/23  1014 02/16/23  1024 02/17/23  0422   Sodium Level mmol/L 140  --  140   Potassium Level mmol/L 4.2  --  4.3   Chloride mmol/L 103  --  103   Carbon Dioxide mmol/L 24  --  27   Glucose Level mg/dL 166*  --  131*   Glucose, UA mg/dL  --  3+*  --    Blood Urea Nitrogen mg/dL 25.4  --  27.1*   Creatinine mg/dL 1.18  --  1.09   Albumin Level g/dL 4.2  --  4.1   Bilirubin Total mg/dL 0.4  --  0.4   Alkaline Phosphatase unit/L 58  --  54   Aspartate Aminotransferase unit/L 20  --  15   Alanine Aminotransferase unit/L 34  --  27       Drug levels (last 3 results):  No results for input(s): VANCOMYCINRA, VANCORANDOM, VANCOMYCINPE,  VANCOPEAK, VANCOMYCINTR, VANCOTROUGH in the last 72 hours.    Microbiologic Results:  Microbiology Results (last 7 days)       Procedure Component Value Units Date/Time    Respiratory Culture [[id number]] Collected: 02/17/23 0516    Order Status: Completed Specimen: Sputum, Expectorated Updated: 02/17/23 1308     GRAM STAIN Quality 3+      Moderate Gram positive cocci      Moderate Gram Positive Rods      Few Gram Negative Rods    Blood culture #2 **CANNOT BE ORDERED STAT** [[id number]] Collected: 02/16/23 1623    Order Status: Resulted Specimen: Blood Updated: 02/16/23 1749    Blood culture #1 **CANNOT BE ORDERED STAT** [[id number]] Collected: 02/16/23 1623    Order Status: Resulted Specimen: Blood Updated: 02/16/23 1741

## 2023-02-18 LAB
ALBUMIN SERPL-MCNC: 3.9 G/DL (ref 3.4–4.8)
ALBUMIN/GLOB SERPL: 1.3 RATIO (ref 1.1–2)
ALP SERPL-CCNC: 52 UNIT/L (ref 40–150)
ALT SERPL-CCNC: 29 UNIT/L (ref 0–55)
AST SERPL-CCNC: 20 UNIT/L (ref 5–34)
BASOPHILS # BLD AUTO: 0.07 X10(3)/MCL (ref 0–0.2)
BASOPHILS NFR BLD AUTO: 0.8 %
BILIRUBIN DIRECT+TOT PNL SERPL-MCNC: 0.6 MG/DL
BUN SERPL-MCNC: 23.6 MG/DL (ref 8.4–25.7)
CALCIUM SERPL-MCNC: 9.1 MG/DL (ref 8.8–10)
CHLORIDE SERPL-SCNC: 103 MMOL/L (ref 98–107)
CO2 SERPL-SCNC: 25 MMOL/L (ref 23–31)
CREAT SERPL-MCNC: 1.06 MG/DL (ref 0.73–1.18)
DEPRECATED CALCIDIOL+CALCIFEROL SERPL-MC: 33.7 NG/ML (ref 30–80)
EOSINOPHIL # BLD AUTO: 0.07 X10(3)/MCL (ref 0–0.9)
EOSINOPHIL NFR BLD AUTO: 0.8 %
ERYTHROCYTE [DISTWIDTH] IN BLOOD BY AUTOMATED COUNT: 13.3 % (ref 11.5–17)
EST. AVERAGE GLUCOSE BLD GHB EST-MCNC: 137 MG/DL
GFR SERPLBLD CREATININE-BSD FMLA CKD-EPI: >60 MLS/MIN/1.73/M2
GLOBULIN SER-MCNC: 3.1 GM/DL (ref 2.4–3.5)
GLUCOSE SERPL-MCNC: 103 MG/DL (ref 82–115)
HBA1C MFR BLD: 6.4 %
HCT VFR BLD AUTO: 46.3 % (ref 42–52)
HGB BLD-MCNC: 15.5 G/DL (ref 14–18)
IMM GRANULOCYTES # BLD AUTO: 0.4 X10(3)/MCL (ref 0–0.04)
IMM GRANULOCYTES NFR BLD AUTO: 4.8 %
LYMPHOCYTES # BLD AUTO: 2.31 X10(3)/MCL (ref 0.6–4.6)
LYMPHOCYTES NFR BLD AUTO: 27.7 %
MAGNESIUM SERPL-MCNC: 2.5 MG/DL (ref 1.6–2.6)
MCH RBC QN AUTO: 31.4 PG
MCHC RBC AUTO-ENTMCNC: 33.5 G/DL (ref 33–36)
MCV RBC AUTO: 93.7 FL (ref 80–94)
MONOCYTES # BLD AUTO: 0.86 X10(3)/MCL (ref 0.1–1.3)
MONOCYTES NFR BLD AUTO: 10.3 %
NEUTROPHILS # BLD AUTO: 4.62 X10(3)/MCL (ref 2.1–9.2)
NEUTROPHILS NFR BLD AUTO: 55.6 %
NRBC BLD AUTO-RTO: 0 %
PLATELET # BLD AUTO: 214 X10(3)/MCL (ref 130–400)
PMV BLD AUTO: 10 FL (ref 7.4–10.4)
POCT GLUCOSE: 110 MG/DL (ref 70–110)
POCT GLUCOSE: 144 MG/DL (ref 70–110)
POCT GLUCOSE: 97 MG/DL (ref 70–110)
POTASSIUM SERPL-SCNC: 3.3 MMOL/L (ref 3.5–5.1)
PROT SERPL-MCNC: 7 GM/DL (ref 5.8–7.6)
RBC # BLD AUTO: 4.94 X10(6)/MCL (ref 4.7–6.1)
SODIUM SERPL-SCNC: 139 MMOL/L (ref 136–145)
WBC # SPEC AUTO: 8.3 X10(3)/MCL (ref 4.5–11.5)

## 2023-02-18 PROCEDURE — 85025 COMPLETE CBC W/AUTO DIFF WBC: CPT | Performed by: INTERNAL MEDICINE

## 2023-02-18 PROCEDURE — 25000003 PHARM REV CODE 250: Performed by: NURSE PRACTITIONER

## 2023-02-18 PROCEDURE — 83036 HEMOGLOBIN GLYCOSYLATED A1C: CPT | Performed by: INTERNAL MEDICINE

## 2023-02-18 PROCEDURE — 25000242 PHARM REV CODE 250 ALT 637 W/ HCPCS: Performed by: INTERNAL MEDICINE

## 2023-02-18 PROCEDURE — 80053 COMPREHEN METABOLIC PANEL: CPT | Performed by: INTERNAL MEDICINE

## 2023-02-18 PROCEDURE — 25000242 PHARM REV CODE 250 ALT 637 W/ HCPCS: Performed by: NURSE PRACTITIONER

## 2023-02-18 PROCEDURE — 99900035 HC TECH TIME PER 15 MIN (STAT)

## 2023-02-18 PROCEDURE — 94761 N-INVAS EAR/PLS OXIMETRY MLT: CPT

## 2023-02-18 PROCEDURE — 94640 AIRWAY INHALATION TREATMENT: CPT

## 2023-02-18 PROCEDURE — 25000003 PHARM REV CODE 250: Performed by: INTERNAL MEDICINE

## 2023-02-18 PROCEDURE — 63600175 PHARM REV CODE 636 W HCPCS: Performed by: NURSE PRACTITIONER

## 2023-02-18 PROCEDURE — 27000221 HC OXYGEN, UP TO 24 HOURS

## 2023-02-18 PROCEDURE — 83735 ASSAY OF MAGNESIUM: CPT | Performed by: INTERNAL MEDICINE

## 2023-02-18 PROCEDURE — 99900031 HC PATIENT EDUCATION (STAT)

## 2023-02-18 PROCEDURE — 63600175 PHARM REV CODE 636 W HCPCS: Performed by: INTERNAL MEDICINE

## 2023-02-18 PROCEDURE — 82306 VITAMIN D 25 HYDROXY: CPT | Performed by: INTERNAL MEDICINE

## 2023-02-18 PROCEDURE — 21400001 HC TELEMETRY ROOM

## 2023-02-18 RX ORDER — IPRATROPIUM BROMIDE AND ALBUTEROL SULFATE 2.5; .5 MG/3ML; MG/3ML
3 SOLUTION RESPIRATORY (INHALATION)
Status: DISCONTINUED | OUTPATIENT
Start: 2023-02-18 | End: 2023-02-19 | Stop reason: HOSPADM

## 2023-02-18 RX ORDER — BUDESONIDE 0.5 MG/2ML
0.5 INHALANT ORAL EVERY 12 HOURS
Status: DISCONTINUED | OUTPATIENT
Start: 2023-02-18 | End: 2023-02-19 | Stop reason: HOSPADM

## 2023-02-18 RX ORDER — AMOXICILLIN 250 MG
2 CAPSULE ORAL DAILY
Status: DISCONTINUED | OUTPATIENT
Start: 2023-02-18 | End: 2023-02-19 | Stop reason: HOSPADM

## 2023-02-18 RX ADMIN — IPRATROPIUM BROMIDE AND ALBUTEROL SULFATE 3 ML: 2.5; .5 SOLUTION RESPIRATORY (INHALATION) at 07:02

## 2023-02-18 RX ADMIN — IPRATROPIUM BROMIDE AND ALBUTEROL SULFATE 3 ML: 2.5; .5 SOLUTION RESPIRATORY (INHALATION) at 04:02

## 2023-02-18 RX ADMIN — METOPROLOL SUCCINATE 12.5 MG: 25 TABLET, EXTENDED RELEASE ORAL at 09:02

## 2023-02-18 RX ADMIN — SENNOSIDES AND DOCUSATE SODIUM 2 TABLET: 50; 8.6 TABLET ORAL at 11:02

## 2023-02-18 RX ADMIN — FLUTICASONE FUROATE AND VILANTEROL TRIFENATATE 1 PUFF: 200; 25 POWDER RESPIRATORY (INHALATION) at 09:02

## 2023-02-18 RX ADMIN — CEFTRIAXONE 1 G: 1 INJECTION, POWDER, FOR SOLUTION INTRAMUSCULAR; INTRAVENOUS at 04:02

## 2023-02-18 RX ADMIN — IPRATROPIUM BROMIDE AND ALBUTEROL SULFATE 3 ML: 2.5; .5 SOLUTION RESPIRATORY (INHALATION) at 11:02

## 2023-02-18 RX ADMIN — ATORVASTATIN CALCIUM 20 MG: 10 TABLET, FILM COATED ORAL at 09:02

## 2023-02-18 RX ADMIN — TRAMADOL HYDROCHLORIDE 50 MG: 50 TABLET, COATED ORAL at 01:02

## 2023-02-18 RX ADMIN — GUAIFENESIN AND DEXTROMETHORPHAN HYDROBROMIDE 1 TABLET: 30; 600 TABLET, EXTENDED RELEASE ORAL at 08:02

## 2023-02-18 RX ADMIN — IPRATROPIUM BROMIDE AND ALBUTEROL SULFATE 3 ML: 2.5; .5 SOLUTION RESPIRATORY (INHALATION) at 08:02

## 2023-02-18 RX ADMIN — AZITHROMYCIN MONOHYDRATE 500 MG: 500 INJECTION, POWDER, LYOPHILIZED, FOR SOLUTION INTRAVENOUS at 02:02

## 2023-02-18 RX ADMIN — ESCITALOPRAM OXALATE 20 MG: 10 TABLET ORAL at 09:02

## 2023-02-18 RX ADMIN — TRAMADOL HYDROCHLORIDE 50 MG: 50 TABLET, COATED ORAL at 09:02

## 2023-02-18 RX ADMIN — MUPIROCIN 22 G: 20 OINTMENT TOPICAL at 09:02

## 2023-02-18 RX ADMIN — Medication 1 EACH: at 09:02

## 2023-02-18 RX ADMIN — ONDANSETRON 4 MG: 2 INJECTION INTRAMUSCULAR; INTRAVENOUS at 09:02

## 2023-02-18 RX ADMIN — METOPROLOL SUCCINATE 12.5 MG: 25 TABLET, EXTENDED RELEASE ORAL at 08:02

## 2023-02-18 RX ADMIN — BUDESONIDE 0.5 MG: 0.5 INHALANT RESPIRATORY (INHALATION) at 08:02

## 2023-02-18 RX ADMIN — PANTOPRAZOLE SODIUM 40 MG: 40 TABLET, DELAYED RELEASE ORAL at 08:02

## 2023-02-18 RX ADMIN — BUDESONIDE 0.5 MG: 0.5 INHALANT RESPIRATORY (INHALATION) at 07:02

## 2023-02-18 RX ADMIN — RIVAROXABAN 15 MG: 15 TABLET, FILM COATED ORAL at 08:02

## 2023-02-18 RX ADMIN — GUAIFENESIN AND DEXTROMETHORPHAN HYDROBROMIDE 1 TABLET: 30; 600 TABLET, EXTENDED RELEASE ORAL at 09:02

## 2023-02-18 RX ADMIN — IPRATROPIUM BROMIDE AND ALBUTEROL SULFATE 3 ML: 2.5; .5 SOLUTION RESPIRATORY (INHALATION) at 12:02

## 2023-02-18 NOTE — PROGRESS NOTES
[facility]  PROGRESS [facility]        CHIEF COMPLAINT   Hospital follow up    HOSPITAL COURSE   Dom Varner is a 67 y.o. male who PMH includes MAHSA, COPD, emphysema, chronic respiratory failure with hypoxia on trilogy and oxygen at night, DM type 2, History of PE/DVT after back surgery   Osteoarthritis;  present to the ED at [facility] on 2/16/2023 with a primary complaint of chest pain with associated shortness a breath which started last night and today prompting ED visit.  Patient reports he is on supplemental oxygen therapy per nasal cannula primarily at night however he was needing to wear the oxygen during the day today and reported that his saturations were low at home.  It was reported his O2 saturations were 91% in triage on room air.  Patient and wife stated he normally runs about 88 to 91% on room air sitting at home.  No reports of fever, cough, congestion, nausea, vomiting, diarrhea, or any sick contacts.  Patient is on Xarelto. Labs done demonstrated glucose 166; other indices unremarkable.  Influenza a swab influenza B swab SARs CoV 2 PCR not detected.  Initial vital signs /76 pulse 73 respirations 22 temperature 98.4° F O2 saturation 91% on room air.  Patient was placed on supplemental oxygen therapy with improvement in his saturations to 95%.  Blood cultures were collected x2 sets.  Patient was started on IV azithromycin and ceftriaxone therapy.  Patient is admitted to hospital medicine services for further management.    Today  Spoke to patient and wife this morning they told me that patient typically ranges about 88 to 90% on room air at baseline without oxygen and goes down even further with exertion.  Still with some shortness of breath but improved it seems, steroids was administered to him about for 2 days before coming into the hospital prednisone 50 mg.  Patient stated that his wife was sick very recently right before he got sick and suspected  that she gave him virus.        OBJECTIVE/PHYSICAL EXAM     VITAL SIGNS (MOST RECENT):  Temp: 98.4 °F (36.9 °C) (02/18/23 0819)  Pulse: 70 (02/18/23 0908)  Resp: 18 (02/18/23 0908)  BP: 109/63 (02/18/23 0832)  SpO2: (!) 90 % (02/18/23 0908)   VITAL SIGNS (24 HOUR RANGE):  Temp:  [97.5 °F (36.4 °C)-98.4 °F (36.9 °C)] 98.4 °F (36.9 °C)  Pulse:  [70-88] 70  Resp:  [14-20] 18  SpO2:  [89 %-96 %] 90 %  BP: (109-135)/(63-80) 109/63   GENERAL: In no acute distress, afebrile  HEENT:  CHEST:  Mild expiratory wheeze, diminished breath sounds bilaterally he  HEART: S1, S2, no appreciable murmur  ABDOMEN: Soft, nontender, BS +  MSK: Warm, no lower extremity edema, no clubbing or cyanosis  NEUROLOGIC: Alert and oriented x4, moving all extremities with good strength   INTEGUMENTARY:  PSYCHIATRY:        ASSESSMENT/PLAN   COPD exacerbation  Bronchiolitis-suspected viral    History of:  COPD, OHS on home trilogy and nocturnal oxygen, hepatic steatosis, PE/DVT, diabetes mellitus      Continue Rocephin and azithromycin.  Colonization of MRSA does not indicate active infection.  Discontinue vancomycin.  DuoNeb 4 times daily, budesonide nebs in place of systemic steroids.    Baseline oxygen on room air at rest is 88 to 90%    DVT prophylaxis:  Xarelto    Anticipated discharge and disposition:   __________________________________________________________________________    LABS/MICRO/MEDS/DIAGNOSTICS       LABS  Recent Labs     02/18/23  0329      K 3.3*   CHLORIDE 103   CO2 25   BUN 23.6   CREATININE 1.06   GLUCOSE 103   CALCIUM 9.1   ALKPHOS 52   AST 20   ALT 29   ALBUMIN 3.9     Recent Labs     02/18/23  0329   WBC 8.3   RBC 4.94   HCT 46.3   MCV 93.7          MICROBIOLOGY  Microbiology Results (last 7 days)       Procedure Component Value Units Date/Time    Respiratory Culture [[id number]]  (Abnormal) Collected: 02/17/23 0516    Order Status: Completed Specimen: Sputum, Expectorated Updated: 02/18/23 0729     Respiratory  Culture Moderate Gram-negative Rods     Comment: with normal respiratory josselin        GRAM STAIN Quality 3+      Moderate Gram positive cocci      Moderate Gram Positive Rods      Few Gram Negative Rods    Blood culture #2 **CANNOT BE ORDERED STAT** [[id number]]  (Normal) Collected: 02/16/23 1623    Order Status: Completed Specimen: Blood Updated: 02/17/23 1900     CULTURE, BLOOD ([facility]) No Growth At 24 Hours    Blood culture #1 **CANNOT BE ORDERED STAT** [[id number]]  (Normal) Collected: 02/16/23 1623    Order Status: Completed Specimen: Blood Updated: 02/17/23 1900     CULTURE, BLOOD ([facility]) No Growth At 24 Hours               MEDICATIONS   albuterol-ipratropium  3 mL Nebulization Q8H    atorvastatin  20 mg Oral QHS    azithromycin  500 mg Intravenous Q24H    budesonide  0.5 mg Nebulization Q12H    cefTRIAXone (ROCEPHIN) IVPB  1 g Intravenous Q24H    dextromethorphan-guaiFENesin  mg  1 tablet Oral BID    EScitalopram oxalate  20 mg Oral Nightly    fluticasone furoate-vilanteroL  1 puff Inhalation Daily    Lactobacillus rhamnosus GG  1 packet Oral Daily    metoprolol succinate  12.5 mg Oral BID    mupirocin  1 Tube Nasal BID    pantoprazole  40 mg Oral Daily    rivaroxaban  15 mg Oral QAM         INFUSIONS         DIAGNOSTIC TESTS  X-Ray Chest 1 View   Final Result      Lung volumes are low with associated atelectatic change.         Electronically signed by: Rosanne Ocampo   Date:    02/17/2023   Time:    13:39      CT Abdomen Pelvis With Contrast   Final Result      Hepatic steatosis      Right renal cyst      Bibasilar lung atelectasis      Otherwise grossly unremarkable         Electronically signed by: Fabby Cuevas   Date:    02/16/2023   Time:    15:26      CTA Chest Non-Coronary (PE Studies)   Final Result      1. Multilobar tree-in-bud nodularity within the lungs, most pronounced in the dependent right upper lobe.  Appearance most compatible with infectious or inflammatory bronchiolitis.   2. No  evidence of pulmonary embolus.         Electronically signed by: Rosanne Ocampo   Date:    02/16/2023   Time:    15:24      X-Ray Chest 1 View   Final Result      No acute chest disease is identified.         Electronically signed by: Romulo Ewing   Date:    02/16/2023   Time:    10:41           EF   Date Value Ref Range Status   02/16/2023 60 % Final              Case related differential diagnoses have been reviewed; assessment and plan has been documented. I have personally reviewed the labs and test results that are currently available; I have reviewed the patients medication list. I have reviewed the consulting providers recommendations. I have reviewed or attempted to review medical records based upon their availability.  All of the patient's and/or family's questions have been addressed and answered to the best of my ability.  I will continue to monitor closely and make adjustments to medical management as needed.  This document was created using [employer] Fluency Direct.  Transcription errors may have been made.  Please contact me if any questions may rise regarding documentation to clarify transcription.        César Dupont MD   02/18/2023   Internal Medicine

## 2023-02-19 VITALS
OXYGEN SATURATION: 94 % | TEMPERATURE: 98 F | SYSTOLIC BLOOD PRESSURE: 117 MMHG | WEIGHT: 245.13 LBS | HEART RATE: 72 BPM | RESPIRATION RATE: 18 BRPM | BODY MASS INDEX: 33.2 KG/M2 | HEIGHT: 72 IN | DIASTOLIC BLOOD PRESSURE: 68 MMHG

## 2023-02-19 LAB
BACTERIA SPEC CULT: ABNORMAL
GRAM STN SPEC: ABNORMAL
POCT GLUCOSE: 130 MG/DL (ref 70–110)

## 2023-02-19 PROCEDURE — 25000003 PHARM REV CODE 250: Performed by: INTERNAL MEDICINE

## 2023-02-19 PROCEDURE — 25000242 PHARM REV CODE 250 ALT 637 W/ HCPCS: Performed by: INTERNAL MEDICINE

## 2023-02-19 PROCEDURE — 94640 AIRWAY INHALATION TREATMENT: CPT

## 2023-02-19 PROCEDURE — 25000003 PHARM REV CODE 250: Performed by: NURSE PRACTITIONER

## 2023-02-19 PROCEDURE — 99900031 HC PATIENT EDUCATION (STAT)

## 2023-02-19 RX ORDER — LEVOFLOXACIN 750 MG/1
750 TABLET ORAL DAILY
Qty: 6 TABLET | Refills: 0 | Status: SHIPPED | OUTPATIENT
Start: 2023-02-20 | End: 2023-02-26

## 2023-02-19 RX ADMIN — RIVAROXABAN 15 MG: 15 TABLET, FILM COATED ORAL at 08:02

## 2023-02-19 RX ADMIN — METOPROLOL SUCCINATE 12.5 MG: 25 TABLET, EXTENDED RELEASE ORAL at 08:02

## 2023-02-19 RX ADMIN — PANTOPRAZOLE SODIUM 40 MG: 40 TABLET, DELAYED RELEASE ORAL at 08:02

## 2023-02-19 RX ADMIN — TRAMADOL HYDROCHLORIDE 50 MG: 50 TABLET, COATED ORAL at 10:02

## 2023-02-19 RX ADMIN — BUDESONIDE 0.5 MG: 0.5 INHALANT RESPIRATORY (INHALATION) at 08:02

## 2023-02-19 RX ADMIN — Medication 1 EACH: at 08:02

## 2023-02-19 RX ADMIN — MUPIROCIN 22 G: 20 OINTMENT TOPICAL at 08:02

## 2023-02-19 RX ADMIN — SENNOSIDES AND DOCUSATE SODIUM 2 TABLET: 50; 8.6 TABLET ORAL at 08:02

## 2023-02-19 RX ADMIN — GUAIFENESIN AND DEXTROMETHORPHAN HYDROBROMIDE 1 TABLET: 30; 600 TABLET, EXTENDED RELEASE ORAL at 08:02

## 2023-02-19 RX ADMIN — FLUTICASONE FUROATE AND VILANTEROL TRIFENATATE 1 PUFF: 200; 25 POWDER RESPIRATORY (INHALATION) at 08:02

## 2023-02-19 RX ADMIN — LEVOFLOXACIN 750 MG: 500 TABLET, FILM COATED ORAL at 08:02

## 2023-02-19 RX ADMIN — IPRATROPIUM BROMIDE AND ALBUTEROL SULFATE 3 ML: 2.5; .5 SOLUTION RESPIRATORY (INHALATION) at 08:02

## 2023-02-19 NOTE — NURSING
PT discharged to home. Pt discharge education provided with family present. Paper script given to client, follow up appointments with primary discussed. Pt left via wheelchair by [facility] transport to ED entrance. PT IV and telemetry discontinued.

## 2023-02-19 NOTE — DISCHARGE SUMMARY
[facility]  HOSPITAL MEDICINE   DISCHARGE SUMMARY    Patient Name: Dom Varner  MRN: [id number]  Admission Date: 2/16/2023  Hospital Length of Stay: 3 days  Discharge Date and Time: 02/19/2023  Discharge Provider: César Dupont  Primary Care Provider: Gildardo Villanueva MD      HOSPITAL COURSE   Dom Varner is a 67 y.o. male who PMH includes MAHSA, COPD, emphysema, chronic respiratory failure with hypoxia on trilogy and oxygen at night, DM type 2, History of PE/DVT after back surgery   Osteoarthritis;  present to the ED at [facility] on 2/16/2023 with a primary complaint of chest pain with associated shortness a breath which started last night and today prompting ED visit.  Patient reports he is on supplemental oxygen therapy per nasal cannula primarily at night however he was needing to wear the oxygen during the day today and reported that his saturations were low at home.  It was reported his O2 saturations were 91% in triage on room air.  Patient and wife stated he normally runs about 88 to 91% on room air sitting at home.  No reports of fever, cough, congestion, nausea, vomiting, diarrhea, or any sick contacts.  Patient is on Xarelto. Labs done demonstrated glucose 166; other indices unremarkable.  Influenza a swab influenza B swab SARs CoV 2 PCR not detected.  Initial vital signs /76 pulse 73 respirations 22 temperature 98.4° F O2 saturation 91% on room air.  Patient was placed on supplemental oxygen therapy with improvement in his saturations to 95%.  Blood cultures were collected x2 sets.  Patient was started on IV azithromycin and ceftriaxone therapy.  Patient is admitted to hospital medicine services for further management.  Sputum culture collected which subsequently grew out Pseudomonas aeruginosa sensitive to fluoroquinolone.  Nasal swab MRSA PCR was positive also however I suspect that both of those are chronic colonization rather than actual infection.  CT scan did show  multilobar tree-in-bud nodularity most pronounced in the right upper lobe compatible with infection or inflammatory bronchiolitis.  He told me that his wife had similar symptoms very recently and subsequently he started having symptoms I suspect that this is more of a viral illness.  Regardless since he does have bad lungs and COPD we will go ahead and treat him with fluoroquinolone Levaquin x7 days.  He follows with pulmonologist Dr. Diaz had prescribed him home oxygen as he was hypoxic with ambulation however he only uses as needed during the daytime and has been hesitant to use during the daytime.  Discussed with him that he should consider using it 24 hours as it may improve his quality of life currently he is unable to ambulate far due to shortness of breath.  Otherwise he is doing well this morning on room air he is satting about 88 to 90%, he has nebulizing treatments at home and uses Trelegy for maintenance therapy.  Steroids made his blood glucose significantly elevated so we will avoid steroids at this time he was treated with budesonide nebs while in the hospital.        PHYSICAL EXAM     Most Recent Vital Signs:  Temp: 97.8 °F (36.6 °C) (02/19/23 0800)  Pulse: 72 (02/19/23 0825)  Resp: 18 (02/19/23 0825)  BP: 117/68 (02/19/23 0824)  SpO2: (!) 94 % (02/19/23 0825)     GENERAL: In no acute distress, afebrile  HEENT:  CHEST:  Diminished breath sounds but without wheeze  HEART: S1, S2, no appreciable murmur  ABDOMEN: Soft, nontender, BS +  MSK: Warm, no lower extremity edema, no clubbing or cyanosis  NEUROLOGIC: Alert and oriented x4, moving all extremities with good strength   INTEGUMENTARY:  PSYCHIATRY:          DISCHARGE DIAGNOSIS   COPD exacerbation  Bronchiolitis secondary to Pseudomonas  Suspected viral bronchiolitis superimposed with Pseudomonas     History of:  COPD, OHS on home trilogy and nocturnal oxygen, hepatic steatosis, PE/DVT, diabetes  mellitus    _____________________________________________________________________________      DISCHARGE MED REC     Current Discharge Medication List        START taking these medications    Details   levoFLOXacin (LEVAQUIN) 750 MG tablet Take 1 tablet (750 mg total) by mouth once daily. for 6 days  Qty: 6 tablet, Refills: 0           CONTINUE these medications which have NOT CHANGED    Details   arformoteroL (BROVANA) 15 mcg/2 mL Nebu Take 15 mcg by nebulization 2 (two) times a day. Controller      atorvastatin (LIPITOR) 20 MG tablet atorvastatin 20 mg tablet   TAKE 1 TABLET BY MOUTH EVERY DAY IN THE EVENING      EScitalopram oxalate (LEXAPRO) 20 MG tablet Take 20 mg by mouth nightly.      FARXIGA 10 mg tablet Take 10 mg by mouth every morning.      fluticasone-umeclidin-vilanter (TRELEGY ELLIPTA) 100-62.5-25 mcg DsDv 1 puff every morning.      furosemide (LASIX) 20 MG tablet 20 mg every morning.      melatonin 10 mg Subl Place under the tongue.      meloxicam (MOBIC) 15 MG tablet 15 mg nightly.      metFORMIN (GLUCOPHAGE) 500 MG tablet Take 500 mg by mouth 2 (two) times daily.      metoprolol succinate (TOPROL-XL) 25 MG 24 hr tablet Take 12.5 mg by mouth 2 (two) times a day.      multivitamin with minerals tablet Take 1 tablet by mouth once daily.      omeprazole (PRILOSEC) 20 MG capsule Take 20 mg by mouth every morning.      potassium chloride (K-TAB) 20 mEq 20 mEq nightly.      XARELTO 20 mg Tab Take 15 mg by mouth every morning.           STOP taking these medications       cloNIDine (CATAPRES) 0.3 MG tablet Comments:   Reason for Stopping:         pantoprazole (PROTONIX) 40 MG tablet Comments:   Reason for Stopping:                  CONSULTS         FOLLOW UP      Follow-up Information       Gildardo Villanueva MD Follow up in 1 week(s).    Specialty: Family Medicine  Contact information:  [address] [place] [phone]                                 DISCHARGE INSTRUCTIONS     Explained in detail to  the patient about the discharge plan, medications, and follow-up visits. Pt understands and agrees with the treatment plan.  Discharged Condition: stable  Diet as tolerated  Activities as tolerated  Discharge to:  Home     TIME SPENT ON DISCHARGE   35 minutes        César Mahoney M.D, on 2/19/2023  Internal Medicine  Department of [facility]    This document was created using electronic dictation services.  Please excuse any errors that may have been made.  Contact me if any questions regarding documentation to clarify verbiage.

## 2023-02-21 LAB
BACTERIA BLD CULT: NORMAL
BACTERIA BLD CULT: NORMAL

## 2023-02-22 ENCOUNTER — PATIENT OUTREACH (OUTPATIENT)
Dept: ADMINISTRATIVE | Facility: CLINIC | Age: 68
End: 2023-02-22
Payer: MEDICARE

## 2023-02-22 NOTE — PROGRESS NOTES
C3 nurse spoke with Dom Varner  for a TCC post hospital discharge follow up call. Patient unable to verify medications and consent daughter Catrina be notified for follow up call. Called daughterCatrina's phone number. No answer. Left voicemail with call back number. The patient does not have a scheduled HOSFU appointment noted.

## 2023-02-23 NOTE — PROGRESS NOTES
C3 nurse attempted to contact Ashia, patient's daughter for a TCC post hospital discharge follow up call. No answer. Left voicemail with callback information. The patient does not have a scheduled HOSFU appointment noted.

## 2023-02-24 NOTE — PROGRESS NOTES
C3 nurse spoke with Dom Varner  for a TCC post hospital discharge follow up call. Unable to verify medications and daughter Catrina @ work. O phone number provided to patient.

## 2023-02-24 NOTE — PROGRESS NOTES
C3 nurse spoke with patient's daughter Catrina for a TCC post hospital discharge follow up call. The patient has a scheduled HOS appointment with Gildardo Villanueva MD  on 02/28/2023.    Patient taking Klonepin 0.5 mg twice a day

## 2023-02-28 NOTE — PHYSICIAN QUERY
PT Name: Dom Varner  MR #: [id number]     Documentation Clarification      CDS/: Zina Emery RN CCDS            Contact information:[email or web address]    This form is a permanent document in the medical record.     Query Date: February 28, 2023    By submitting this query, we are merely seeking further clarification of documentation. Please utilize your independent clinical judgment when addressing the question(s) below.    The Medical Record reflects the following:    Supporting Clinical Findings Location in Medical Record   Imaging with evidence of infiltrates, started on antibiotics.    Pneumonia-bacterial,right CAP, POA    follow respiratory  cultures.  MRSA PCR positive.  Will add vancomycin.  Continue empiric antibiotic therapy.  Awaiting chest x-ray and TTE   ER provider note    [facility] medicine PN 2/17    Blood cultures were collected x2 sets.  Patient was started on IV azithromycin and ceftriaxone therapy    Sputum culture collected which subsequently grew out Pseudomonas aeruginosa sensitive to fluoroquinolone.  Nasal swab MRSA PCR was positive also however I suspect that both of those are chronic colonization rather than actual infection.  CT scan did show  multilobar tree-in-bud nodularity most pronounced in the right upper lobe compatible with infection or inflammatory bronchiolitis.     I suspect that  this is more of a viral illness.  Regardless since he does have bad lungs and COPD we will go ahead and treat him with fluoroquinolone Levaquin x7 days.    Bronchiolitis secondary to Pseudomonas  Suspected viral bronchiolitis superimposed with Pseudomonas Discharge Summary      Discharge Summary              Discharge Summary        Discharge Summary                                                                            Please further clarify the pneumonia.  Thank you.    [  x ] Viral Bronchiolitis with Superimposed Pseudomonas Pneumonia     [   ] Bronchiolitis secondary to  Pseudomonas     [   ] Other (please specify): ____________     [  ] Clinically undetermined